# Patient Record
Sex: FEMALE | Race: WHITE | NOT HISPANIC OR LATINO | Employment: FULL TIME | URBAN - NONMETROPOLITAN AREA
[De-identification: names, ages, dates, MRNs, and addresses within clinical notes are randomized per-mention and may not be internally consistent; named-entity substitution may affect disease eponyms.]

---

## 2017-03-03 DIAGNOSIS — I10 ESSENTIAL HYPERTENSION: ICD-10-CM

## 2017-03-03 RX ORDER — LISINOPRIL AND HYDROCHLOROTHIAZIDE 20; 12.5 MG/1; MG/1
1 TABLET ORAL DAILY
Qty: 90 TAB | Refills: 3 | Status: SHIPPED | OUTPATIENT
Start: 2017-03-03 | End: 2018-04-19 | Stop reason: SDUPTHER

## 2017-03-06 ENCOUNTER — TELEPHONE (OUTPATIENT)
Dept: MEDICAL GROUP | Facility: PHYSICIAN GROUP | Age: 77
End: 2017-03-06

## 2017-03-06 NOTE — TELEPHONE ENCOUNTER
1. Caller Name: Katrin                                Call Back Number: [phone] (home)       Patient approves a detailed voicemail message: N\A    Katrin requesting refill Lisinopril/HCTZ. Informed RX approved Friday 3/3/17 [facility].

## 2017-03-31 ENCOUNTER — OFFICE VISIT (OUTPATIENT)
Dept: MEDICAL GROUP | Facility: PHYSICIAN GROUP | Age: 77
End: 2017-03-31
Payer: MEDICARE

## 2017-03-31 VITALS
RESPIRATION RATE: 16 BRPM | SYSTOLIC BLOOD PRESSURE: 140 MMHG | DIASTOLIC BLOOD PRESSURE: 96 MMHG | HEIGHT: 61 IN | BODY MASS INDEX: 31.53 KG/M2 | HEART RATE: 87 BPM | OXYGEN SATURATION: 94 % | TEMPERATURE: 97 F | WEIGHT: 167 LBS

## 2017-03-31 DIAGNOSIS — I10 ESSENTIAL HYPERTENSION: ICD-10-CM

## 2017-03-31 DIAGNOSIS — M81.0 HIGH RISK FOR FRACTURE DUE TO OSTEOPOROSIS BY DEXA SCAN: ICD-10-CM

## 2017-03-31 DIAGNOSIS — E78.5 HYPERLIPIDEMIA, UNSPECIFIED HYPERLIPIDEMIA TYPE: ICD-10-CM

## 2017-03-31 DIAGNOSIS — M67.432 GANGLION OF LEFT WRIST: ICD-10-CM

## 2017-03-31 DIAGNOSIS — M81.0 OSTEOPOROSIS: ICD-10-CM

## 2017-03-31 DIAGNOSIS — Z80.3 FAMILY HISTORY OF BREAST CANCER: ICD-10-CM

## 2017-03-31 DIAGNOSIS — Z12.31 ENCOUNTER FOR SCREENING MAMMOGRAM FOR BREAST CANCER: ICD-10-CM

## 2017-03-31 DIAGNOSIS — M67.40 GANGLION CYST: ICD-10-CM

## 2017-03-31 DIAGNOSIS — Z23 NEED FOR PNEUMOCOCCAL VACCINATION: ICD-10-CM

## 2017-03-31 DIAGNOSIS — R79.89 ELEVATED TSH: ICD-10-CM

## 2017-03-31 DIAGNOSIS — M75.52 BURSITIS OF LEFT SHOULDER: ICD-10-CM

## 2017-03-31 DIAGNOSIS — E55.9 VITAMIN D DEFICIENCY: ICD-10-CM

## 2017-03-31 PROCEDURE — 3014F SCREEN MAMMO DOC REV: CPT | Performed by: NURSE PRACTITIONER

## 2017-03-31 PROCEDURE — 3017F COLORECTAL CA SCREEN DOC REV: CPT | Performed by: NURSE PRACTITIONER

## 2017-03-31 PROCEDURE — 1101F PT FALLS ASSESS-DOCD LE1/YR: CPT | Performed by: NURSE PRACTITIONER

## 2017-03-31 PROCEDURE — 4040F PNEUMOC VAC/ADMIN/RCVD: CPT | Performed by: NURSE PRACTITIONER

## 2017-03-31 PROCEDURE — G8419 CALC BMI OUT NRM PARAM NOF/U: HCPCS | Performed by: NURSE PRACTITIONER

## 2017-03-31 PROCEDURE — G8482 FLU IMMUNIZE ORDER/ADMIN: HCPCS | Performed by: NURSE PRACTITIONER

## 2017-03-31 PROCEDURE — 1036F TOBACCO NON-USER: CPT | Performed by: NURSE PRACTITIONER

## 2017-03-31 PROCEDURE — G8432 DEP SCR NOT DOC, RNG: HCPCS | Performed by: NURSE PRACTITIONER

## 2017-03-31 PROCEDURE — 99214 OFFICE O/P EST MOD 30 MIN: CPT | Performed by: NURSE PRACTITIONER

## 2017-03-31 ASSESSMENT — PAIN SCALES - GENERAL: PAINLEVEL: NO PAIN

## 2017-03-31 ASSESSMENT — PATIENT HEALTH QUESTIONNAIRE - PHQ9: CLINICAL INTERPRETATION OF PHQ2 SCORE: 0

## 2017-03-31 NOTE — ASSESSMENT & PLAN NOTE
Unclear control, patient is due for labs prior to follow-up. Patient continues on vitamin D supplementation daily.

## 2017-03-31 NOTE — ASSESSMENT & PLAN NOTE
Unclear control, due for labs. Patient denies any hair loss, weight gain, constipation, mental fog, fatigue.

## 2017-03-31 NOTE — PROGRESS NOTES
"[facility]  Primary Care Office Visit - Problem-Oriented        History:     Katrin Jackson is a 76 y.o. female who is here today to discuss Follow-Up      Bursitis of left shoulder  Patient was seen at [facility] and diagnosed with frozen shoulder. She underwent 6 weeks of physical therapy and unfortunately this failed to improve her range of motion or pain. She underwent surgical manipulation, she now has good range of motion of her shoulder, she is diligent about doing home exercises. She has virtually no pain.     Hypertension  This is a chronic condition which is well controlled on medications. Patient is tolerating medications without side effects. She denies chest pain, shortness of breath, change of vision, headaches.  Monitoring at home SBP generally in 120-130's         Osteoporosis  This is a 76-year-old female with history of osteoporosis here for follow-up. Her last DEXA was done in 2014. She was started on Fosamax but abruptly discontinued due to severe multi-joint pain. She was approved for prolia infusion, unfortunately this caused severe multi-joint pain for over a month. She continues to take vitamin D and calcium. She continues to exercise regularly. She has had no fractures. We discussed repeating her DEXA prior to trialing any alternate medications.    Ganglion of left wrist  Ongoing, stable. Patient reports that she has a small nodule on her left ventral wrist as well as AC fossa. These nodules do not cause her pain. They have not increased in size. She declines referral for general surgery as they do not seem to bother her she \"just wanted to mention them.\"     Vitamin D deficiency  Unclear control, patient is due for labs prior to follow-up. Patient continues on vitamin D supplementation daily.    Hyperlipidemia  Unclear control, due for fasting labs.    Elevated TSH  Unclear control, due for labs. Patient denies any hair loss, weight gain, constipation, mental fog, " fatigue.    Family history of breast cancer  Due for mammogram, last done in 2016, normal.           Past Medical History   Diagnosis Date   • Arthritis    • Hypertension    • Hyperlipidemia 7/11/2014     Past Surgical History   Procedure Laterality Date   • Abdominal hysterectomy total       Social History     Social History   • Marital Status: Unknown     Spouse Name: N/A   • Number of Children: N/A   • Years of Education: N/A     Occupational History   • Not on file.     Social History Main Topics   • Smoking status: Never Smoker    • Smokeless tobacco: Never Used   • Alcohol Use: Yes      Comment: occasional   • Drug Use: No   • Sexual Activity: Not on file     Other Topics Concern   • Not on file     Social History Narrative     History   Smoking status   • Never Smoker    Smokeless tobacco   • Never Used     Family History   Problem Relation Age of Onset   • Cancer Mother 53     pancreas   • Diabetes Neg Hx    • Heart Disease Neg Hx    • Stroke Neg Hx    • Cancer Sister      breast     Allergies   Allergen Reactions   • Percodan [Oxycodone-Aspirin] Itching       Problem List:     Patient Active Problem List    Diagnosis Date Noted   • Bursitis of left shoulder 10/14/2016   • Ganglion of left wrist 10/14/2016   • Skin tag 01/15/2016   • Osteoporosis 07/11/2014   • Hyperlipidemia 07/11/2014   • Vitamin D deficiency 07/11/2014   • Hypertension 06/13/2014   • Elevated TSH 06/13/2014   • Family history of breast cancer 06/13/2014         Medications:     Current outpatient prescriptions:   •  pneumococcal 13-Elvira Conj Vacc (PREVNAR 13) syringe, 0.5 mL by Intramuscular route Once PRN for up to 1 dose. Please fax once administered to update records, Disp: 0.5 Each, Rfl: 0  •  lisinopril-hydrochlorothiazide (PRINZIDE, ZESTORETIC) 20-12.5 MG per tablet, Take 1 Tab by mouth every day., Disp: 90 Tab, Rfl: 3  •  Multiple Vitamins-Minerals (CENTRUM ADULTS PO), Take  by mouth., Disp: , Rfl:   •  Calcium Carb-Cholecalciferol  "(CALCIUM + D3 PO), Take  by mouth., Disp: , Rfl:   •  meloxicam (MOBIC) 15 MG tablet, Take 0.5-1 Tabs by mouth every day., Disp: 30 Tab, Rfl: 2      Review of Systems:     Positives as per HPI, all other systems reviewed and WNL       Physical Assessment:     VS: /96 mmHg  Pulse 87  Temp(Src) 36.1 °C (97 °F)  Resp 16  Ht 1.549 m (5' 1\")  Wt 75.751 kg (167 lb)  BMI 31.57 kg/m2  SpO2 94%    General: Well-developed, well-nourished, female     Head: PERRL, EOMI. Normocephalic. No facial asymmetry noted.  Cardiovasc:No JVD.  RRR, no MRG. No thrills or bruits. Pulses 2+ and symmetric at all distal extremities.  Pulmonary: Lungs clear bilaterally.  Normal respiratory effort. No wheeze or crackles.   Extremities: small palpable round nodules on flexor tendon sheath of left wrist and left elbow. No edema, no TTP bilateral calves. Pedal pulses intact. No joint effusions. LEs warm and well-perfused.  Neuro: Alert and oriented, CNs II-XII intact, no focal deficits.  Skin:No rashes noted. Skin warm, dry, intact    Psych: Dressed appropriately for the weather, pleasant and conversant.  Affect, mood & judgment appropriate.    Assessment/Plan:   Katrin was seen today for follow-up.    Diagnoses and all orders for this visit:    Essential hypertension, chronic, well controlled on present medication  - follow up annually, labs prior   -     COMP METABOLIC PANEL; Future    High risk for fracture due to osteoporosis by DEXA scan  - Unable to tolerate bisphosphonates, prolia. Repeat DEXA, continue vitamin D and calcium supplement as well as weightbearing exercise. Consider calcitonin.  -     DS-BONE DENSITY STUDY (DEXA); Future    Osteoporosis, unclear control   - treatment as above   -     DS-BONE DENSITY STUDY (DEXA); Future    Ganglion of left wrist, ongoing, stable     Hyperlipidemia, unspecified hyperlipidemia type, unclear control   -     LIPID PROFILE; Future    Vitamin D deficiency, unclear control   - continue " daily vitamin D supplement   -     VITAMIN D,25 HYDROXY; Future    Elevated TSH, unclear control   -     TSH WITH REFLEX TO FT4; Future    Encounter for screening mammogram for breast cancer  -     MA-SCREEN MAMMO W/CAD-BILAT; Future    Family history of breast cancer    Need for pneumococcal vaccination  -     pneumococcal 13-Elvira Conj Vacc (PREVNAR 13) syringe; 0.5 mL by Intramuscular route Once PRN for up to 1 dose. Please fax once administered to update records    Follow up annually, labs prior. Will discuss osteoporosis treatments with patient following DEXA results.     Patient is agreeable to the above plan and voiced understanding. All questions answered.     Please note that this dictation was created using voice recognition software. I have made every reasonable attempt to correct obvious errors, but I expect that there are errors of grammar and possibly content that I did not discover before finalizing the note.      KEILA Bledsoe  3/31/2017, 11:27 AM

## 2017-03-31 NOTE — ASSESSMENT & PLAN NOTE
This is a chronic condition which is well controlled on medications. Patient is tolerating medications without side effects. She denies chest pain, shortness of breath, change of vision, headaches.  Monitoring at home SBP generally in 120-130's

## 2017-03-31 NOTE — MR AVS SNAPSHOT
"        Katrin Jackson   3/31/2017 11:00 AM   Office Visit   MRN: [id number]    Department:  [facility]   Dept Phone:  [phone]    Description:  Female : 1940   Provider:  CLARITA Paulino           Reason for Visit     Follow-Up BP medication       Allergies as of 3/31/2017     Allergen Noted Reactions    Percodan [Oxycodone-Aspirin] 2012   Itching      You were diagnosed with     Bursitis of left shoulder   [491194]       Essential hypertension   [2408724]       High risk for fracture due to osteoporosis by DEXA scan   [978408]       Osteoporosis   [3617675]       Encounter for screening mammogram for breast cancer   [7997142]       Need for pneumococcal vaccination   [459713]       Ganglion cyst   [525742]         Vital Signs     Blood Pressure Pulse Temperature Respirations Height Weight    140/96 mmHg 87 36.1 °C (97 °F) 16 1.549 m (5' 1\") 75.751 kg (167 lb)    Body Mass Index Oxygen Saturation Smoking Status             31.57 kg/m2 94% Never Smoker          Basic Information     Date Of Birth Sex Race Ethnicity Preferred Language    1940 Female White Non- English      Problem List              ICD-10-CM Priority Class Noted - Resolved    Chest pain R07.9   2014 - Present    Hypertension I10   2014 - Present    Elevated TSH R94.6   2014 - Present    Family history of breast cancer Z80.3   2014 - Present    Osteoporosis M81.0   2014 - Present    Hyperlipidemia E78.5   2014 - Present    Vitamin D deficiency E55.9   2014 - Present    Skin tag L91.8   1/15/2016 - Present    Bursitis of left shoulder M75.52   10/14/2016 - Present    Ganglion of left wrist M67.432   10/14/2016 - Present      Health Maintenance        Date Due Completion Dates    IMM DTaP/Tdap/Td Vaccine (1 - Tdap) 1961 ---    IMM ZOSTER VACCINE 2002 ---    IMM PNEUMOCOCCAL 65+ (ADULT) LOW/MEDIUM RISK SERIES (1 of 2 - PCV13) 2007 ---    MAMMOGRAM 2017 " 1/26/2016, 6/20/2014    BONE DENSITY 6/13/2019 6/13/2014 (Done)    Override on 6/13/2014: Done    COLONOSCOPY 6/13/2019 6/13/2009 (Prv Comp)    Override on 6/13/2009: Previously completed            Current Immunizations     Influenza Vaccine Adult HD 10/14/2016    Pneumococcal polysaccharide vaccine (PPSV-23) 10/18/2013      Below and/or attached are the medications your provider expects you to take. Review all of your home medications and newly ordered medications with your provider and/or pharmacist. Follow medication instructions as directed by your provider and/or pharmacist. Please keep your medication list with you and share with your provider. Update the information when medications are discontinued, doses are changed, or new medications (including over-the-counter products) are added; and carry medication information at all times in the event of emergency situations     Allergies:  PERCODAN - Itching               Medications  Valid as of: March 31, 2017 - 11:21 AM    Generic Name Brand Name Tablet Size Instructions for use    Calcium Carb-Cholecalciferol   Take  by mouth.        Lisinopril-Hydrochlorothiazide (Tab) PRINZIDE, ZESTORETIC 20-12.5 MG Take 1 Tab by mouth every day.        Meloxicam (Tab) MOBIC 15 MG Take 0.5-1 Tabs by mouth every day.        Multiple Vitamins-Minerals   Take  by mouth.        Pneumococcal 13-Elvira Conj Vacc (Suspension) PREVNAR 13  0.5 mL by Intramuscular route Once PRN for up to 1 dose. Please fax once administered to update records        .                 Medicines prescribed today were sent to:     [facility] [address] [place] [address] [address] [facility]    [address] [facility] [address]    Phone: [phone] Fax: [phone]    Open 24 Hours?: No      Medication refill instructions:       If your prescription bottle indicates you have medication refills left, it is not necessary to call your provider’s office. Please contact your pharmacy and they will  refill your medication.    If your prescription bottle indicates you do not have any refills left, you may request refills at any time through one of the following ways: The online [employer] system (except Urgent Care), by calling your provider’s office, or by asking your pharmacy to contact your provider’s office with a refill request. Medication refills are processed only during regular business hours and may not be available until the next business day. Your provider may request additional information or to have a follow-up visit with you prior to refilling your medication.   *Please Note: Medication refills are assigned a new Rx number when refilled electronically. Your pharmacy may indicate that no refills were authorized even though a new prescription for the same medication is available at the pharmacy. Please request the medicine by name with the pharmacy before contacting your provider for a refill.        Your To Do List     Future Labs/Procedures Complete By Expires    DS-BONE DENSITY STUDY (DEXA)  As directed 10/1/2017    MA-SCREEN MAMMO W/CAD-BILAT  As directed 3/31/2018         [employer] Access Code: [email or web address]  Expires: 4/30/2017 11:21 AM    [employer]  A secure, online tool to manage your health information     [employer]® is a secure, online tool that connects you to your personalized health information from the privacy of your home -- day or night - making it very easy for you to manage your healthcare. Once the activation process is completed, you can even access your medical information using the [employer] yany, which is available for free in the Apple Yany store or Google Play store.     [employer] provides the following levels of access (as shown below):   My Chart Features   [facility] Doctor [facility]  Specialists [facility]  Urgent  Care Non-[facility]  Primary Care  Doctor   Email your healthcare team securely and privately 24/7 X X X    Manage appointments: schedule your next  appointment; view details of past/upcoming appointments X      Request prescription refills. X      View recent personal medical records, including lab and immunizations X X X X   View health record, including health history, allergies, medications X X X X   Read reports about your outpatient visits, procedures, consult and ER notes X X X X   See your discharge summary, which is a recap of your hospital and/or ER visit that includes your diagnosis, lab results, and care plan. X X       How to register for [employer]:  1. Go to  [email or web address].  2. Click on the Sign Up Now box, which takes you to the New Member Sign Up page. You will need to provide the following information:  a. Enter your [employer] Access Code exactly as it appears at the top of this page. (You will not need to use this code after you’ve completed the sign-up process. If you do not sign up before the expiration date, you must request a new code.)   b. Enter your date of birth.   c. Enter your home email address.   d. Click Submit, and follow the next screen’s instructions.  3. Create a [employer] ID. This will be your [employer] login ID and cannot be changed, so think of one that is secure and easy to remember.  4. Create a [employer] password. You can change your password at any time.  5. Enter your Password Reset Question and Answer. This can be used at a later time if you forget your password.   6. Enter your e-mail address. This allows you to receive e-mail notifications when new information is available in [employer].  7. Click Sign Up. You can now view your health information.    For assistance activating your [employer] account, call [phone]

## 2017-03-31 NOTE — ASSESSMENT & PLAN NOTE
"Ongoing, stable. Patient reports that she has a small nodule on her left ventral wrist as well as AC fossa. These nodules do not cause her pain. They have not increased in size. She declines referral for general surgery as they do not seem to bother her she \"just wanted to mention them.\"   "

## 2017-03-31 NOTE — ASSESSMENT & PLAN NOTE
This is a 76-year-old female with history of osteoporosis here for follow-up. Her last DEXA was done in 2014. She was started on Fosamax but abruptly discontinued due to severe multi-joint pain. She was approved for prolia infusion, unfortunately this caused severe multi-joint pain for over a month. She continues to take vitamin D and calcium. She continues to exercise regularly. She has had no fractures. We discussed repeating her DEXA prior to trialing any alternate medications.

## 2017-03-31 NOTE — ASSESSMENT & PLAN NOTE
Patient was seen at [facility] and diagnosed with frozen shoulder. She underwent 6 weeks of physical therapy and unfortunately this failed to improve her range of motion or pain. She underwent surgical manipulation, she now has good range of motion of her shoulder, she is diligent about doing home exercises. She has virtually no pain.

## 2017-09-14 ENCOUNTER — TELEPHONE (OUTPATIENT)
Dept: MEDICAL GROUP | Facility: PHYSICIAN GROUP | Age: 77
End: 2017-09-14

## 2017-09-14 DIAGNOSIS — M81.0 AGE-RELATED OSTEOPOROSIS WITHOUT CURRENT PATHOLOGICAL FRACTURE: ICD-10-CM

## 2017-09-14 NOTE — TELEPHONE ENCOUNTER
Patient's bone density test is reviewed. It appears that she has osteoporosis. She needs to follow-up with her provider to discuss possible treatment and/or follow-up. Please notify.    Leah Berrios    .

## 2017-10-02 ENCOUNTER — TELEPHONE (OUTPATIENT)
Dept: MEDICAL GROUP | Facility: PHYSICIAN GROUP | Age: 77
End: 2017-10-02

## 2017-10-02 NOTE — TELEPHONE ENCOUNTER
The alternative to Evista is making sure she is on ca and vit d , regular weight bearing exercises and rechecking dexa in a year. Either option would be fine.   Adilia Russell M.D.

## 2017-10-02 NOTE — TELEPHONE ENCOUNTER
Jerica,   I looked it up, seems like a good option as long as she has no active breast cancer or DVT or clots. And advise on risk of cardiovascular disease and blood clots.   Adilia Russell M.D.

## 2017-10-02 NOTE — TELEPHONE ENCOUNTER
----- Message from CLARITA Paulino sent at 10/2/2017  9:17 AM PDT -----  Regarding: osteoporosis   Hi Dr. LO,     I have a patient with DEXA confirmed osteopenia, T score of -2.4 which as you know is right on the border of osteopenia and osteoporosis.     In the past she has been treated with Fosamax and Prolia, both of which caused extreme multi-joint pain. She is interested in treatment.     I don't have any experience with Evista which is an antiestrogen and I don't know that I feel comfortable prescribing this without discussing with you first.     Do you have any other recommendations?

## 2017-10-16 ENCOUNTER — OFFICE VISIT (OUTPATIENT)
Dept: MEDICAL GROUP | Facility: PHYSICIAN GROUP | Age: 77
End: 2017-10-16
Payer: MEDICARE

## 2017-10-16 VITALS
RESPIRATION RATE: 16 BRPM | DIASTOLIC BLOOD PRESSURE: 78 MMHG | OXYGEN SATURATION: 97 % | WEIGHT: 165 LBS | SYSTOLIC BLOOD PRESSURE: 136 MMHG | BODY MASS INDEX: 31.15 KG/M2 | HEART RATE: 78 BPM | TEMPERATURE: 97.8 F | HEIGHT: 61 IN

## 2017-10-16 DIAGNOSIS — E55.9 VITAMIN D DEFICIENCY: ICD-10-CM

## 2017-10-16 DIAGNOSIS — E78.5 DYSLIPIDEMIA: ICD-10-CM

## 2017-10-16 DIAGNOSIS — M81.0 AGE-RELATED OSTEOPOROSIS WITHOUT CURRENT PATHOLOGICAL FRACTURE: ICD-10-CM

## 2017-10-16 DIAGNOSIS — I10 ESSENTIAL HYPERTENSION: ICD-10-CM

## 2017-10-16 PROCEDURE — 99214 OFFICE O/P EST MOD 30 MIN: CPT | Performed by: FAMILY MEDICINE

## 2017-10-16 NOTE — PROGRESS NOTES
"Subjective:   Katrin Jackson is a 77 y.o. female here today for evaluation and management of:     Osteoporosis  Improved now osteopenia  Side effects to bisphosphonates  Both oral and infusion  Continue on ca 1200mg and on vit D at least 1000 daily.       Hypertension  Chronic well controlled.      mammo just done at [facility] normal reported by patient.     Current medicines (including changes today)  Current Outpatient Prescriptions   Medication Sig Dispense Refill   • lisinopril-hydrochlorothiazide (PRINZIDE, ZESTORETIC) 20-12.5 MG per tablet Take 1 Tab by mouth every day. 90 Tab 3   • Multiple Vitamins-Minerals (CENTRUM ADULTS PO) Take  by mouth.     • Calcium Carb-Cholecalciferol (CALCIUM + D3 PO) Take  by mouth.       No current facility-administered medications for this visit.      She  has a past medical history of Arthritis; Hyperlipidemia (7/11/2014); and Hypertension. She also has no past medical history of Allergy, unspecified not elsewhere classified; Clotting disorder (CMS-[facility]); Heart attack; Heart murmur; Seizure (CMS-HCC); Stroke (CMS-HCC); Thyroid disease; Type II or unspecified type diabetes mellitus without mention of complication, not stated as uncontrolled; or Unspecified asthma(493.90).    ROS  No chest pain, no shortness of breath, no abdominal pain       Objective:     Blood pressure 136/78, pulse 78, temperature 36.6 °C (97.8 °F), resp. rate 16, height 1.549 m (5' 1\"), weight 74.8 kg (165 lb), SpO2 97 %. Body mass index is 31.18 kg/m².   Physical Exam:  Constitutional: Alert, no distress.  Skin: Warm, dry, good turgor, no rashes in visible areas.  Eye: Equal, round and reactive, conjunctiva clear, lids normal.  ENMT: Lips without lesions, good dentition, oropharynx clear.  Neck: Trachea midline, no masses, no thyromegaly. No cervical or supraclavicular lymphadenopathy  Respiratory: Unlabored respiratory effort, lungs clear to auscultation, no wheezes, no ronchi.  Cardiovascular: Normal S1, S2, " no murmur, no edema.  Abdomen: Soft, non-tender, no masses, no hepatosplenomegaly.  Psych: Alert and oriented x3, normal affect and mood.        Assessment and Plan:   The following treatment plan was discussed    1. Age-related osteoporosis without current pathological fracture  Recent dexa reviewed with patient. Advised to continue with calcium and vitamin d supplements and with regular weight bearing exercises.       Followup: Return in about 6 months (around 4/16/2018) for labs. .

## 2017-10-16 NOTE — ASSESSMENT & PLAN NOTE
Improved now osteopenia  Side effects to bisphosphonates  Both oral and infusion  Continue on ca 1200mg and on vit D at least 1000 daily.

## 2017-10-18 ENCOUNTER — HOSPITAL ENCOUNTER (OUTPATIENT)
Dept: LAB | Facility: MEDICAL CENTER | Age: 77
End: 2017-10-18
Attending: FAMILY MEDICINE
Payer: MEDICARE

## 2017-10-18 DIAGNOSIS — M81.0 AGE-RELATED OSTEOPOROSIS WITHOUT CURRENT PATHOLOGICAL FRACTURE: ICD-10-CM

## 2017-10-18 DIAGNOSIS — E78.5 DYSLIPIDEMIA: ICD-10-CM

## 2017-10-18 DIAGNOSIS — E55.9 VITAMIN D DEFICIENCY: ICD-10-CM

## 2017-10-18 LAB
25(OH)D3 SERPL-MCNC: 33 NG/ML (ref 30–100)
ALBUMIN SERPL BCP-MCNC: 4.3 G/DL (ref 3.2–4.9)
ALBUMIN/GLOB SERPL: 1.7 G/DL
ALP SERPL-CCNC: 61 U/L (ref 30–99)
ALT SERPL-CCNC: 25 U/L (ref 2–50)
ANION GAP SERPL CALC-SCNC: 8 MMOL/L (ref 0–11.9)
AST SERPL-CCNC: 24 U/L (ref 12–45)
BILIRUB SERPL-MCNC: 0.5 MG/DL (ref 0.1–1.5)
BUN SERPL-MCNC: 22 MG/DL (ref 8–22)
CALCIUM SERPL-MCNC: 10.2 MG/DL (ref 8.5–10.5)
CHLORIDE SERPL-SCNC: 106 MMOL/L (ref 96–112)
CHOLEST SERPL-MCNC: 191 MG/DL (ref 100–199)
CO2 SERPL-SCNC: 28 MMOL/L (ref 20–33)
CREAT SERPL-MCNC: 0.82 MG/DL (ref 0.5–1.4)
GFR SERPL CREATININE-BSD FRML MDRD: >60 ML/MIN/1.73 M 2
GLOBULIN SER CALC-MCNC: 2.6 G/DL (ref 1.9–3.5)
GLUCOSE SERPL-MCNC: 79 MG/DL (ref 65–99)
HDLC SERPL-MCNC: 41 MG/DL
LDLC SERPL CALC-MCNC: 113 MG/DL
POTASSIUM SERPL-SCNC: 3.9 MMOL/L (ref 3.6–5.5)
PROT SERPL-MCNC: 6.9 G/DL (ref 6–8.2)
SODIUM SERPL-SCNC: 142 MMOL/L (ref 135–145)
TRIGL SERPL-MCNC: 184 MG/DL (ref 0–149)

## 2017-10-18 PROCEDURE — 80061 LIPID PANEL: CPT

## 2017-10-18 PROCEDURE — 36415 COLL VENOUS BLD VENIPUNCTURE: CPT

## 2017-10-18 PROCEDURE — 80053 COMPREHEN METABOLIC PANEL: CPT

## 2017-10-18 PROCEDURE — 82306 VITAMIN D 25 HYDROXY: CPT

## 2018-01-02 ENCOUNTER — TELEPHONE (OUTPATIENT)
Dept: MEDICAL GROUP | Facility: PHYSICIAN GROUP | Age: 78
End: 2018-01-02

## 2018-04-16 ENCOUNTER — OFFICE VISIT (OUTPATIENT)
Dept: MEDICAL GROUP | Facility: PHYSICIAN GROUP | Age: 78
End: 2018-04-16
Payer: MEDICARE

## 2018-04-16 VITALS
RESPIRATION RATE: 16 BRPM | WEIGHT: 177 LBS | BODY MASS INDEX: 33.42 KG/M2 | HEART RATE: 78 BPM | HEIGHT: 61 IN | SYSTOLIC BLOOD PRESSURE: 132 MMHG | OXYGEN SATURATION: 95 % | DIASTOLIC BLOOD PRESSURE: 76 MMHG | TEMPERATURE: 98.7 F

## 2018-04-16 DIAGNOSIS — E66.9 OBESITY (BMI 30-39.9): ICD-10-CM

## 2018-04-16 DIAGNOSIS — E78.5 HYPERLIPIDEMIA, UNSPECIFIED HYPERLIPIDEMIA TYPE: ICD-10-CM

## 2018-04-16 DIAGNOSIS — M81.0 AGE-RELATED OSTEOPOROSIS WITHOUT CURRENT PATHOLOGICAL FRACTURE: ICD-10-CM

## 2018-04-16 DIAGNOSIS — R79.89 ELEVATED TSH: ICD-10-CM

## 2018-04-16 DIAGNOSIS — I10 ESSENTIAL HYPERTENSION: ICD-10-CM

## 2018-04-16 DIAGNOSIS — Z12.39 BREAST CANCER SCREENING: ICD-10-CM

## 2018-04-16 PROCEDURE — 99214 OFFICE O/P EST MOD 30 MIN: CPT | Performed by: FAMILY MEDICINE

## 2018-04-16 ASSESSMENT — PATIENT HEALTH QUESTIONNAIRE - PHQ9: CLINICAL INTERPRETATION OF PHQ2 SCORE: 0

## 2018-04-16 NOTE — ASSESSMENT & PLAN NOTE
She takes lisinopril-hctz 20-12.5 with normal blood pressure control, CMP reviewed and normal  She has no chest pain, no swelling in legs, no palpitations.

## 2018-04-16 NOTE — ASSESSMENT & PLAN NOTE
Most recent dexa in 2017 showed improvement in bone density. She takes ca and vit D  Encouraged regular weight bearing exercises.   Recheck dexa in 2019

## 2018-04-16 NOTE — ASSESSMENT & PLAN NOTE
Elevated TG   LDL improving   HDL above 40  Encouraged vegetables in diet  Results for SACHIN HOSKINS (MRN [id number]) as of 4/16/2018 08:15   Ref. Range 10/18/2017 08:30   Cholesterol,Tot Latest Ref Range: 100 - 199 mg/dL 191   Triglycerides Latest Ref Range: 0 - 149 mg/dL 184 (H)   HDL Latest Ref Range: >=40 mg/dL 41   LDL Latest Ref Range: <100 mg/dL 113 (H)

## 2018-04-16 NOTE — PROGRESS NOTES
"Subjective:   Sachin Jackson is a 77 y.o. female here today for evaluation and management of:     Hyperlipidemia  Elevated TG   LDL improving   HDL above 40  Encouraged vegetables in diet  Results for SACHIN JACKSON (MRN [id number]) as of 4/16/2018 08:15   Ref. Range 10/18/2017 08:30   Cholesterol,Tot Latest Ref Range: 100 - 199 mg/dL 191   Triglycerides Latest Ref Range: 0 - 149 mg/dL 184 (H)   HDL Latest Ref Range: >=40 mg/dL 41   LDL Latest Ref Range: <100 mg/dL 113 (H)       Hypertension  She takes lisinopril-hctz 20-12.5 with normal blood pressure control, CMP reviewed and normal  She has no chest pain, no swelling in legs, no palpitations.     Osteoporosis  Most recent dexa in 2017 showed improvement in bone density. She takes ca and vit D  Encouraged regular weight bearing exercises.   Recheck dexa in 2019         Current medicines (including changes today)  Current Outpatient Prescriptions   Medication Sig Dispense Refill   • lisinopril-hydrochlorothiazide (PRINZIDE, ZESTORETIC) 20-12.5 MG per tablet Take 1 Tab by mouth every day. 90 Tab 3   • Multiple Vitamins-Minerals (CENTRUM ADULTS PO) Take  by mouth.     • Calcium Carb-Cholecalciferol (CALCIUM + D3 PO) Take  by mouth.       No current facility-administered medications for this visit.      She  has a past medical history of Arthritis; Hyperlipidemia (7/11/2014); and Hypertension. She also has no past medical history of Allergy, unspecified not elsewhere classified; Clotting disorder (CMS-[facility]); Heart attack; Heart murmur; Seizure (CMS-HCC); Stroke (CMS-HCC); Thyroid disease; Type II or unspecified type diabetes mellitus without mention of complication, not stated as uncontrolled; or Unspecified asthma(493.90).    ROS  No chest pain, no shortness of breath, no abdominal pain       Objective:     Blood pressure 132/76, pulse 78, temperature 37.1 °C (98.7 °F), resp. rate 16, height 1.549 m (5' 1\"), weight 80.3 kg (177 lb), SpO2 95 %. Body mass index is " 33.44 kg/m².   Physical Exam:  Constitutional: Alert, no distress.  Skin: Warm, dry, good turgor, no rashes in visible areas.  Eye: Equal, round and reactive, conjunctiva clear, lids normal.  ENMT: Lips without lesions, good dentition, oropharynx clear.  Neck: Trachea midline, no masses, no thyromegaly. No cervical or supraclavicular lymphadenopathy  Respiratory: Unlabored respiratory effort, lungs clear to auscultation, no wheezes, no ronchi.  Cardiovascular: Normal S1, S2, no murmur, no edema.  Abdomen: Soft, non-tender, no masses, no hepatosplenomegaly.  Psych: Alert and oriented x3, normal affect and mood.        Assessment and Plan:   The following treatment plan was discussed    1. Obesity (BMI 30-39.9)  - Patient identified as having weight management issue.  Appropriate orders and counseling given.    2. Breast cancer screening  - MA-SCREEN MAMMO W/CAD-BILAT; Future    3. Hyperlipidemia, unspecified hyperlipidemia type  Improving, advised on diet changes, regular activity.     4. Essential hypertension  Well controlled, continue lisinopril-hctz    5. Age-related osteoporosis without current pathological fracture  Improving  Continue vit D and calcium  Continue weight bearing exercises  Recheck dexa 2019      Followup: No Follow-up on file.

## 2018-04-19 DIAGNOSIS — I10 ESSENTIAL HYPERTENSION: ICD-10-CM

## 2018-04-19 NOTE — TELEPHONE ENCOUNTER
Patient is leaving out of town tomorrow, and is needing lisinpril to be sent [facility] pharmacy. Please advise thank you.

## 2018-04-20 DIAGNOSIS — I10 ESSENTIAL HYPERTENSION: ICD-10-CM

## 2018-04-20 NOTE — TELEPHONE ENCOUNTER
Was the patient seen in the last year in this department? Yes     Does patient have an active prescription for medications requested? No     Received Request Via: Pharmacy      Pt met protocol?: Yes    LAST OV 04/16/2018    BP Readings from Last 1 Encounters:   04/16/18 132/76     Lab Results   Component Value Date/Time    CHOLSTRLTOT 191 10/18/2017 08:30 AM     (H) 10/18/2017 08:30 AM    HDL 41 10/18/2017 08:30 AM    TRIGLYCERIDE 184 (H) 10/18/2017 08:30 AM       Lab Results   Component Value Date/Time    SODIUM 142 10/18/2017 08:30 AM    POTASSIUM 3.9 10/18/2017 08:30 AM    CHLORIDE 106 10/18/2017 08:30 AM    CO2 28 10/18/2017 08:30 AM    GLUCOSE 79 10/18/2017 08:30 AM    BUN 22 10/18/2017 08:30 AM    CREATININE 0.82 10/18/2017 08:30 AM     Lab Results   Component Value Date/Time    ALKPHOSPHAT 61 10/18/2017 08:30 AM    ASTSGOT 24 10/18/2017 08:30 AM    ALTSGPT 25 10/18/2017 08:30 AM    TBILIRUBIN 0.5 10/18/2017 08:30 AM

## 2018-04-21 RX ORDER — LISINOPRIL AND HYDROCHLOROTHIAZIDE 20; 12.5 MG/1; MG/1
1 TABLET ORAL DAILY
Qty: 90 TAB | Refills: 3 | Status: SHIPPED | OUTPATIENT
Start: 2018-04-21 | End: 2019-05-16 | Stop reason: SDUPTHER

## 2018-04-21 RX ORDER — LISINOPRIL AND HYDROCHLOROTHIAZIDE 20; 12.5 MG/1; MG/1
TABLET ORAL
Refills: 3 | OUTPATIENT
Start: 2018-04-21

## 2018-04-23 ENCOUNTER — TELEPHONE (OUTPATIENT)
Dept: MEDICAL GROUP | Facility: PHYSICIAN GROUP | Age: 78
End: 2018-04-23

## 2019-05-16 DIAGNOSIS — I10 ESSENTIAL HYPERTENSION: ICD-10-CM

## 2019-05-16 RX ORDER — LISINOPRIL AND HYDROCHLOROTHIAZIDE 20; 12.5 MG/1; MG/1
1 TABLET ORAL DAILY
Qty: 90 TAB | Refills: 3 | Status: SHIPPED | OUTPATIENT
Start: 2019-05-16 | End: 2020-05-14

## 2019-05-16 NOTE — TELEPHONE ENCOUNTER
Was the patient seen in the last year in this department? Yes    Does patient have an active prescription for medications requested? Yes    Received Request Via: Patient     Patient is out of med

## 2019-12-24 ENCOUNTER — OFFICE VISIT (OUTPATIENT)
Dept: URGENT CARE | Facility: PHYSICIAN GROUP | Age: 79
End: 2019-12-24
Payer: MEDICARE

## 2019-12-24 VITALS
TEMPERATURE: 98 F | WEIGHT: 173 LBS | RESPIRATION RATE: 16 BRPM | DIASTOLIC BLOOD PRESSURE: 88 MMHG | HEIGHT: 62 IN | SYSTOLIC BLOOD PRESSURE: 128 MMHG | HEART RATE: 84 BPM | BODY MASS INDEX: 31.83 KG/M2 | OXYGEN SATURATION: 92 %

## 2019-12-24 DIAGNOSIS — H92.01 OTALGIA OF RIGHT EAR: ICD-10-CM

## 2019-12-24 DIAGNOSIS — H65.191 ACUTE EFFUSION OF RIGHT EAR: ICD-10-CM

## 2019-12-24 PROCEDURE — 99214 OFFICE O/P EST MOD 30 MIN: CPT | Performed by: PHYSICIAN ASSISTANT

## 2019-12-24 RX ORDER — AMOXICILLIN AND CLAVULANATE POTASSIUM 875; 125 MG/1; MG/1
1 TABLET, FILM COATED ORAL 2 TIMES DAILY
Qty: 14 TAB | Refills: 0 | Status: SHIPPED | OUTPATIENT
Start: 2019-12-24 | End: 2019-12-31

## 2019-12-24 RX ORDER — FLUTICASONE PROPIONATE 50 MCG
1 SPRAY, SUSPENSION (ML) NASAL DAILY
Qty: 16 G | Refills: 0 | Status: SHIPPED | OUTPATIENT
Start: 2019-12-24 | End: 2020-09-22

## 2019-12-24 ASSESSMENT — ENCOUNTER SYMPTOMS
NAUSEA: 0
FEVER: 0
CHILLS: 0
VOMITING: 0
SINUS PAIN: 0

## 2019-12-24 NOTE — PROGRESS NOTES
Subjective:   Katrin Jackson is a 79 y.o. female who presents for Otalgia (goes down to her jaw x 4 days)        This is a new problem.  Patient complains of right ear pain x4 days.  Pain is deep and aching and occasionally radiates down her jaw.  Intermittent tooth pain. No pain with chewing, sinus pressure, nasal congestion.  Pain is worse at night.  No other aggravating or alleviating factors.  She is taking Tylenol and ibuprofen with moderate symptomatic improvement.  Denies nausea, vomiting, fevers, chills.  Denies history of recurrent ear infections.  No allergies to medications.    Review of Systems   Constitutional: Negative for chills and fever.   HENT: Positive for ear pain. Negative for congestion, hearing loss, sinus pain and tinnitus.    Gastrointestinal: Negative for nausea and vomiting.       PMH:  has a past medical history of Arthritis, Hyperlipidemia (7/11/2014), and Hypertension. She also has no past medical history of Allergy, unspecified not elsewhere classified, Clotting disorder (HCC), Heart attack (HCC), Heart murmur, Seizure (HCC), Stroke (HCC), Thyroid disease, Type II or unspecified type diabetes mellitus without mention of complication, not stated as uncontrolled, or Unspecified asthma(493.90).  MEDS:   Current Outpatient Medications:   •  amoxicillin-clavulanate (AUGMENTIN) 875-125 MG Tab, Take 1 Tab by mouth 2 times a day for 7 days., Disp: 14 Tab, Rfl: 0  •  fluticasone (FLONASE) 50 MCG/ACT nasal spray, Spray 1 Spray in nose every day., Disp: 16 g, Rfl: 0  •  lisinopril-hydrochlorothiazide (PRINZIDE, ZESTORETIC) 20-12.5 MG per tablet, Take 1 Tab by mouth every day., Disp: 90 Tab, Rfl: 3  •  Multiple Vitamins-Minerals (CENTRUM ADULTS PO), Take  by mouth., Disp: , Rfl:   •  Calcium Carb-Cholecalciferol (CALCIUM + D3 PO), Take  by mouth., Disp: , Rfl:   ALLERGIES:   Allergies   Allergen Reactions   • Percodan [Oxycodone-Aspirin] Itching     SURGHX:   Past Surgical History:   Procedure  "Laterality Date   • SHOULDER SURGERY Left 2016    adhesive capsulitis   • ABDOMINAL HYSTERECTOMY TOTAL       SOCHX:  reports that she has never smoked. She has never used smokeless tobacco. She reports current alcohol use. She reports that she does not use drugs.  FH: Family history was reviewed, no pertinent findings to report   Objective:   /88   Pulse 84   Temp 36.7 °C (98 °F) (Temporal)   Resp 16   Ht 1.575 m (5' 2\")   Wt 78.5 kg (173 lb)   SpO2 92%   BMI 31.64 kg/m²   Physical Exam  HENT:      Head:      Comments: Jaw occlusion normal.  TMJ nontender to palpation-no crepitus or clicking.     Right Ear: Ear canal and external ear normal. No mastoid tenderness.      Left Ear: Ear canal and external ear normal. No mastoid tenderness. Tympanic membrane is injected, erythematous and bulging.      Ears:      Comments: Right TM is intact, bulging, opaque, with visible bubbles behind the TM.  Loss of light reflex.  No significant erythema or injection.  No visible pus.     Nose: Nose normal. No mucosal edema or rhinorrhea.      Right Sinus: No maxillary sinus tenderness or frontal sinus tenderness.      Left Sinus: No maxillary sinus tenderness or frontal sinus tenderness.      Mouth/Throat:      Lips: Pink.      Mouth: Mucous membranes are moist.      Pharynx: Oropharynx is clear. Uvula midline.      Comments: Teeth nontender to percussion.  Lymphadenopathy:      Head:      Right side of head: No preauricular or posterior auricular adenopathy.      Left side of head: No preauricular or posterior auricular adenopathy.      Cervical:      Right cervical: No superficial or posterior cervical adenopathy.     Left cervical: No superficial or posterior cervical adenopathy.           Assessment/Plan:   1. Acute effusion of right ear  - amoxicillin-clavulanate (AUGMENTIN) 875-125 MG Tab; Take 1 Tab by mouth 2 times a day for 7 days.  Dispense: 14 Tab; Refill: 0  - fluticasone (FLONASE) 50 MCG/ACT nasal spray; " Spray 1 Spray in nose every day.  Dispense: 16 g; Refill: 0    2. Otalgia of right ear  - amoxicillin-clavulanate (AUGMENTIN) 875-125 MG Tab; Take 1 Tab by mouth 2 times a day for 7 days.  Dispense: 14 Tab; Refill: 0  - fluticasone (FLONASE) 50 MCG/ACT nasal spray; Spray 1 Spray in nose every day.  Dispense: 16 g; Refill: 0    Patient has right-sided ear effusion that appears to be progressing towards an infection.  Patient started on antibiotic therapy and an intranasal steroid.  If patient's symptoms worsen, new symptoms develop or symptoms fail to improve in the next 2 to 3 days I would like her to return to clinic for reevaluation.    Differential diagnosis, natural history, supportive care, and indications for immediate follow-up discussed.

## 2020-05-13 DIAGNOSIS — I10 ESSENTIAL HYPERTENSION: ICD-10-CM

## 2020-05-14 RX ORDER — LISINOPRIL AND HYDROCHLOROTHIAZIDE 20; 12.5 MG/1; MG/1
TABLET ORAL
Qty: 90 TAB | Refills: 0 | Status: SHIPPED | OUTPATIENT
Start: 2020-05-14 | End: 2020-09-21

## 2020-05-14 NOTE — TELEPHONE ENCOUNTER
Was the patient seen in the last year in this department? No     Does patient have an active prescription for medications requested? No     Received Request Via: Pharmacy    Pt met protocol?: No     Last OV 04/16/2018    BP Readings from Last 1 Encounters:   12/24/19 128/88

## 2020-05-14 NOTE — TELEPHONE ENCOUNTER
Last Blood Pressure reading was 128/88 on 12/24/19. Last seen by PCP 04/16/2018.  Pt to make appt prior to more refills.     Will send 3 month(s) to the pharmacy.

## 2020-09-16 DIAGNOSIS — I10 ESSENTIAL HYPERTENSION: ICD-10-CM

## 2020-09-16 RX ORDER — LISINOPRIL AND HYDROCHLOROTHIAZIDE 20; 12.5 MG/1; MG/1
TABLET ORAL
Qty: 90 TAB | Refills: 0 | OUTPATIENT
Start: 2020-09-16

## 2020-09-18 DIAGNOSIS — I10 ESSENTIAL HYPERTENSION: ICD-10-CM

## 2020-09-21 RX ORDER — LISINOPRIL AND HYDROCHLOROTHIAZIDE 20; 12.5 MG/1; MG/1
1 TABLET ORAL DAILY
Qty: 30 TAB | Refills: 0 | Status: SHIPPED | OUTPATIENT
Start: 2020-09-21 | End: 2020-09-22 | Stop reason: SDUPTHER

## 2020-09-22 ENCOUNTER — OFFICE VISIT (OUTPATIENT)
Dept: MEDICAL GROUP | Facility: PHYSICIAN GROUP | Age: 80
End: 2020-09-22
Payer: MEDICARE

## 2020-09-22 VITALS
TEMPERATURE: 98.3 F | HEIGHT: 62 IN | HEART RATE: 95 BPM | SYSTOLIC BLOOD PRESSURE: 142 MMHG | WEIGHT: 171 LBS | BODY MASS INDEX: 31.47 KG/M2 | DIASTOLIC BLOOD PRESSURE: 86 MMHG | RESPIRATION RATE: 12 BRPM | OXYGEN SATURATION: 92 %

## 2020-09-22 DIAGNOSIS — E78.5 HYPERLIPIDEMIA, UNSPECIFIED HYPERLIPIDEMIA TYPE: ICD-10-CM

## 2020-09-22 DIAGNOSIS — E66.9 OBESITY (BMI 30-39.9): ICD-10-CM

## 2020-09-22 DIAGNOSIS — I10 ESSENTIAL HYPERTENSION: ICD-10-CM

## 2020-09-22 DIAGNOSIS — Z12.11 COLON CANCER SCREENING: ICD-10-CM

## 2020-09-22 DIAGNOSIS — Z12.31 ENCOUNTER FOR SCREENING MAMMOGRAM FOR BREAST CANCER: ICD-10-CM

## 2020-09-22 DIAGNOSIS — Z23 NEED FOR VACCINATION: ICD-10-CM

## 2020-09-22 PROCEDURE — G0008 ADMIN INFLUENZA VIRUS VAC: HCPCS | Performed by: FAMILY MEDICINE

## 2020-09-22 PROCEDURE — 99214 OFFICE O/P EST MOD 30 MIN: CPT | Mod: 25 | Performed by: FAMILY MEDICINE

## 2020-09-22 PROCEDURE — 90662 IIV NO PRSV INCREASED AG IM: CPT | Performed by: FAMILY MEDICINE

## 2020-09-22 RX ORDER — LISINOPRIL AND HYDROCHLOROTHIAZIDE 20; 12.5 MG/1; MG/1
1 TABLET ORAL DAILY
Qty: 90 TAB | Refills: 3 | Status: SHIPPED | OUTPATIENT
Start: 2020-09-22 | End: 2021-10-08 | Stop reason: SDUPTHER

## 2020-09-22 ASSESSMENT — PATIENT HEALTH QUESTIONNAIRE - PHQ9: CLINICAL INTERPRETATION OF PHQ2 SCORE: 0

## 2020-09-22 NOTE — ASSESSMENT & PLAN NOTE
Has mild swelling on top of both feet.   Today BP elevated 142/94   She has no CP, no SOB  Will recheck BP at end of visit.   Continue on lisinopril hctz 20-12.5  Encouraged low salt diet, gradual weight loss.

## 2020-09-23 ENCOUNTER — TELEPHONE (OUTPATIENT)
Dept: MEDICAL GROUP | Facility: PHYSICIAN GROUP | Age: 80
End: 2020-09-23

## 2020-09-23 NOTE — PROGRESS NOTES
"Subjective:   Katrin Jackson is a 80 y.o. female here today for evaluation and management of:     Hypertension  Has mild swelling on top of both feet.   Today BP elevated 142/94   She has no CP, no SOB  Will recheck BP at end of visit.   Continue on lisinopril hctz 20-12.5  Encouraged low salt diet, gradual weight loss.          Current medicines (including changes today)  Current Outpatient Medications   Medication Sig Dispense Refill   • lisinopril-hydrochlorothiazide (PRINZIDE) 20-12.5 MG per tablet Take 1 Tab by mouth every day. 90 Tab 3   • Multiple Vitamins-Minerals (CENTRUM ADULTS PO) Take  by mouth.       No current facility-administered medications for this visit.      She  has a past medical history of Arthritis, Hyperlipidemia (7/11/2014), and Hypertension. She also has no past medical history of Allergy, unspecified not elsewhere classified, Clotting disorder (HCC), Heart attack (HCC), Heart murmur, Seizure (HCC), Stroke (HCC), Thyroid disease, Type II or unspecified type diabetes mellitus without mention of complication, not stated as uncontrolled, or Unspecified asthma(493.90).    ROS  No chest pain, no shortness of breath, no abdominal pain       Objective:     /86   Pulse 95   Temp 36.8 °C (98.3 °F) (Temporal)   Resp 12   Ht 1.575 m (5' 2\")   Wt 77.6 kg (171 lb)   SpO2 92%  Body mass index is 31.28 kg/m².   Physical Exam:  Constitutional: Alert, no distress.  Skin: Warm, dry, good turgor, no rashes in visible areas.  Eye: Equal, round and reactive, conjunctiva clear, lids normal.  Neck: Trachea midline  Respiratory: Unlabored respiratory effort  Cardiovascular: mild b/l LE edema  Psych: Alert and oriented x3, normal affect and mood.        Assessment and Plan:   The following treatment plan was discussed    1. Colon cancer screening  - COLOGUARD (FIT DNA)    2. Need for vaccination  - INFLUENZA VACCINE, HIGH DOSE (65+ ONLY)    3. Essential hypertension  Uncontrolled.   - " lisinopril-hydrochlorothiazide (PRINZIDE) 20-12.5 MG per tablet; Take 1 Tab by mouth every day.  Dispense: 90 Tab; Refill: 3    4. Encounter for screening mammogram for breast cancer  - MA-SCREENING MAMMO BILAT W/CAD; Future    5. Obesity (BMI 30-39.9)  - Patient identified as having weight management issue.  Appropriate orders and counseling given.    6. Hyperlipidemia, unspecified hyperlipidemia type  - Comp Metabolic Panel; Future  - Lipid Profile; Future      Followup: Return in about 6 months (around 3/22/2021) for recheck BP and HR before pt leaves pls..

## 2020-09-23 NOTE — TELEPHONE ENCOUNTER
BP and HR not rechecked at end of visit,   pls ask pt to return in 1-2 weeks for BP and HR check.  Thank you  Adilia Russell M.D.

## 2020-09-23 NOTE — TELEPHONE ENCOUNTER
BP and MI was rechecked yesterday. It was 142/86 and HR 95. Would you still like patient to come in 1-2 weeks?

## 2020-11-04 DIAGNOSIS — Z12.31 ENCOUNTER FOR SCREENING MAMMOGRAM FOR BREAST CANCER: ICD-10-CM

## 2021-01-05 NOTE — TELEPHONE ENCOUNTER
1. Caller Name: Katrin CALIXTO Manuel                                           Call Back Number: [phone] (home)         Patient approves a detailed voicemail message: N\A    Asking for refill Lisinopril. Informed Katrin approved 4/21 and I would contact [facility] to make sure RX ready today.  Katrin agreed with plan     Wife Radha called looking for update [phone]

## 2021-01-11 DIAGNOSIS — Z23 NEED FOR VACCINATION: ICD-10-CM

## 2021-10-08 DIAGNOSIS — I10 ESSENTIAL HYPERTENSION: ICD-10-CM

## 2021-10-08 RX ORDER — LISINOPRIL AND HYDROCHLOROTHIAZIDE 20; 12.5 MG/1; MG/1
1 TABLET ORAL DAILY
Qty: 90 TABLET | Refills: 3 | Status: SHIPPED | OUTPATIENT
Start: 2021-10-08 | End: 2022-10-04

## 2021-10-08 NOTE — TELEPHONE ENCOUNTER
Received request via: Patient    Was the patient seen in the last year in this department? No   9/22/20  Does the patient have an active prescription (recently filled or refills available) for medication(s) requested? No

## 2022-01-12 ENCOUNTER — OFFICE VISIT (OUTPATIENT)
Dept: MEDICAL GROUP | Facility: PHYSICIAN GROUP | Age: 82
End: 2022-01-12
Payer: MEDICARE

## 2022-01-12 VITALS
SYSTOLIC BLOOD PRESSURE: 118 MMHG | BODY MASS INDEX: 28.71 KG/M2 | HEIGHT: 62 IN | TEMPERATURE: 97 F | DIASTOLIC BLOOD PRESSURE: 72 MMHG | RESPIRATION RATE: 16 BRPM | WEIGHT: 156 LBS | HEART RATE: 86 BPM | OXYGEN SATURATION: 92 %

## 2022-01-12 DIAGNOSIS — R53.82 CHRONIC FATIGUE: ICD-10-CM

## 2022-01-12 DIAGNOSIS — E55.9 VITAMIN D DEFICIENCY: ICD-10-CM

## 2022-01-12 DIAGNOSIS — I10 PRIMARY HYPERTENSION: ICD-10-CM

## 2022-01-12 DIAGNOSIS — E78.5 HYPERLIPIDEMIA, UNSPECIFIED HYPERLIPIDEMIA TYPE: ICD-10-CM

## 2022-01-12 DIAGNOSIS — H02.826 EYELID CYST, LEFT: ICD-10-CM

## 2022-01-12 DIAGNOSIS — Z12.31 ENCOUNTER FOR SCREENING MAMMOGRAM FOR BREAST CANCER: ICD-10-CM

## 2022-01-12 DIAGNOSIS — Z78.0 MENOPAUSE: ICD-10-CM

## 2022-01-12 DIAGNOSIS — M81.0 AGE-RELATED OSTEOPOROSIS WITHOUT CURRENT PATHOLOGICAL FRACTURE: ICD-10-CM

## 2022-01-12 PROBLEM — E66.9 OBESITY (BMI 30-39.9): Status: RESOLVED | Noted: 2018-04-16 | Resolved: 2022-01-12

## 2022-01-12 PROCEDURE — G0438 PPPS, INITIAL VISIT: HCPCS | Performed by: FAMILY MEDICINE

## 2022-01-12 RX ORDER — ZOSTER VACCINE RECOMBINANT, ADJUVANTED 50 MCG/0.5
0.5 KIT INTRAMUSCULAR ONCE
Qty: 0.5 ML | Refills: 1 | Status: SHIPPED
Start: 2022-01-12 | End: 2022-01-12

## 2022-01-12 RX ORDER — ERYTHROMYCIN 5 MG/G
1 OINTMENT OPHTHALMIC
Qty: 3.5 G | Refills: 0 | Status: SHIPPED | OUTPATIENT
Start: 2022-01-12 | End: 2022-08-17

## 2022-01-12 ASSESSMENT — ENCOUNTER SYMPTOMS: GENERAL WELL-BEING: GOOD

## 2022-01-12 ASSESSMENT — PATIENT HEALTH QUESTIONNAIRE - PHQ9: CLINICAL INTERPRETATION OF PHQ2 SCORE: 0

## 2022-01-12 ASSESSMENT — ACTIVITIES OF DAILY LIVING (ADL): BATHING_REQUIRES_ASSISTANCE: 0

## 2022-01-12 NOTE — PROGRESS NOTES
Chief Complaint   Patient presents with   • Annual Exam     medicare wellness       HPI:  Katrin Jackson is a 81 y.o. here for Medicare Annual Wellness Visit     Patient Active Problem List    Diagnosis Date Noted   • Obesity (BMI 30-39.9) 04/16/2018   • Age-related osteoporosis without current pathological fracture 09/14/2017   • Bursitis of left shoulder 10/14/2016   • Ganglion of left wrist 10/14/2016   • Osteoporosis 07/11/2014   • Hyperlipidemia 07/11/2014   • Vitamin D deficiency 07/11/2014   • Hypertension 06/13/2014   • Family history of breast cancer 06/13/2014       Current Outpatient Medications   Medication Sig Dispense Refill   • lisinopril-hydrochlorothiazide (PRINZIDE) 20-12.5 MG per tablet Take 1 Tablet by mouth every day. 90 Tablet 3   • Multiple Vitamins-Minerals (CENTRUM ADULTS PO) Take  by mouth.       No current facility-administered medications for this visit.          Current supplements as per medication list.     Allergies: Percodan [oxycodone-aspirin]    Current social contact/activities: none      She  reports that she has never smoked. She has never used smokeless tobacco. She reports current alcohol use. She reports that she does not use drugs.  Counseling given: Not Answered      DPA/Advanced Directive:  Patient does not have an Advanced Directive.  A packet and workshop information was given on Advanced Directives.    ROS:    Gait: Uses no assistive device  Ostomy: No  Other tubes: No  Amputations: No  Chronic oxygen use: No  Last eye exam: [id number]   Wears hearing aids: No   : Denies any urinary leakage during the last 6 months    Screening:      Depression Screening    Little interest or pleasure in doing things?  0 - not at all  Feeling down, depressed , or hopeless? 0 - not at all  Patient Health Questionnaire Score: 0     If depressive symptoms identified deferred to follow up visit unless specifically addressed in assessment and plan.    Interpretation of PHQ-9 Total Score    Score Severity   1-4 No Depression   5-9 Mild Depression   10-14 Moderate Depression   15-19 Moderately Severe Depression   20-27 Severe Depression    Screening for Cognitive Impairment    Three Minute Recall (captain, garden, picture) 1/3    Harjit clock face with all 12 numbers and set the hands to show 5 past 8.  Yes    Cognitive concerns identified deferred for follow up unless specifically addressed in assessment and plan.    Fall Risk Assessment    Has the patient had two or more falls in the last year or any fall with injury in the last year?  No    Safety Assessment    Throw rugs on floor.  Yes  Handrails on all stairs.  No  Good lighting in all hallways.  Yes  Difficulty hearing.  No  Patient counseled about all safety risks that were identified.    Functional Assessment ADLs    Are there any barriers preventing you from cooking for yourself or meeting nutritional needs?  No.    Are there any barriers preventing you from driving safely or obtaining transportation?  No.    Are there any barriers preventing you from using a telephone or calling for help?  No.    Are there any barriers preventing you from shopping?  No.    Are there any barriers preventing you from taking care of your own finances?  No.    Are there any barriers preventing you from managing your medications?  No.    Are there any barriers preventing you from showering, bathing or dressing yourself?  No.    Are you currently engaging in any exercise or physical activity?  Yes.     What is your perception of your health?  Good.      Health Maintenance Summary          Overdue - Annual Wellness Visit (Once) Overdue - never done    No completion history exists for this topic.          Overdue - MAMMOGRAM (Yearly) Order placed this encounter    10/13/2020  MA-SCREENING MAMMO BILAT W/CAD    01/26/2016  [id number]    06/20/2014  [id number]          Postponed - IMM ZOSTER VACCINES (1 of 2) Postponed until 6/12/2022    No  completion history exists for this topic.          Postponed - IMM DTaP/Tdap/Td Vaccine (1 - Tdap) Postponed until 1/12/2023    No completion history exists for this topic.          BONE DENSITY (Every 5 Years) Tentatively due on 9/12/2022 09/12/2017  DS-BONE DENSITY STUDY (DEXA)          COLORECTAL CANCER SCREENING (COLOGUARD STOOL DNA - Every 3 Years) Next due on 10/5/2023    10/05/2020  COLOGUARD COLON CANCER SCREENING    10/05/2020  COLOGUARD COLON CANCER SCREENING    06/13/2009  COLONOSCOPY (Previously completed)          IMM PNEUMOCOCCAL VACCINE: 65+ Years (Series Information) Completed    10/21/2018  Imm Admin: Pneumococcal polysaccharide vaccine (PPSV-23)    03/31/2017  Imm Admin: Pneumococcal Vaccine (PCV7) - HISTORICAL DATA    03/31/2017  Imm Admin: Pneumococcal Conjugate Vaccine (Prevnar/PCV-13)    10/18/2013  Imm Admin: Pneumococcal polysaccharide vaccine (PPSV-23)          IMM INFLUENZA (Series Information) Completed    09/29/2021  Outside Immunization: Fluzone High-Dose Quad    09/22/2020  Imm Admin: Influenza Vaccine Adult HD    10/16/2019  Imm Admin: Influenza Vaccine Adult HD    10/21/2018  Imm Admin: Influenza, Unspecified - HISTORICAL DATA    10/21/2017  Imm Admin: Influenza Vaccine Adult HD    Only the first 5 history entries have been loaded, but more history exists.          COVID-19 Vaccine (Series Information) Completed    10/28/2021  Imm Admin: Moderna SARS-CoV-2 Vaccine    02/20/2021  Imm Admin: Moderna SARS-CoV-2 Vaccine    01/23/2021  Imm Admin: Moderna SARS-CoV-2 Vaccine          IMM HEP B VACCINE (Series Information) Aged Out    No completion history exists for this topic.          IMM MENINGOCOCCAL VACCINE (MCV4) (Series Information) Aged Out    No completion history exists for this topic.                Patient Care Team:  Adilia Russell M.D. as PCP - General (Family Medicine)        Social History     Tobacco Use   • Smoking status: Never Smoker   • Smokeless tobacco: Never Used  "  Substance Use Topics   • Alcohol use: Yes     Comment: occasional   • Drug use: No     Family History   Problem Relation Age of Onset   • Cancer Mother 53        pancreas   • Cancer Sister         breast   • Diabetes Neg Hx    • Heart Disease Neg Hx    • Stroke Neg Hx      She  has a past medical history of Arthritis, Hyperlipidemia (7/11/2014), and Hypertension. She also has no past medical history of Allergy, unspecified not elsewhere classified, Clotting disorder (HCC), Heart attack (HCC), Heart murmur, Seizure (HCC), Stroke (HCC), Thyroid disease, Type II or unspecified type diabetes mellitus without mention of complication, not stated as uncontrolled, or Unspecified asthma(493.90).   Past Surgical History:   Procedure Laterality Date   • SHOULDER SURGERY Left 2016    adhesive capsulitis   • ABDOMINAL HYSTERECTOMY TOTAL         Exam:   /72   Pulse 86   Temp 36.1 °C (97 °F) (Temporal)   Resp 16   Ht 1.575 m (5' 2\")   Wt 70.8 kg (156 lb)  Body mass index is 28.53 kg/m².    Hearing excellent.    Dentition good  Alert, oriented in no acute distress.  Eye contact is good, speech goal directed, affect calm    Assessment and Plan. The following treatment and monitoring plan is recommended:    1. Encounter for screening mammogram for breast cancer    Hypertension  Well controlled on lisinopril hctz 20-12.5  She has no CP  No LE edema  She has no syncope or near syncope  She has to sit on the edge of her bed for a few seconds before getting up in the mornings to prevent dizziness.   We discussed lower dose lisionpril 10mg hctz 12.5 but pt notes she was on the lower dose in the past and BP was no controlled.   She does not want a lower dose.   Advised to stay well hydrated, continue with not getting up too quickly.     Eyelid cyst, left  Left lower lid small cyst on the inside  Present for 3-4 weeks  Smaller with hot compresses but not resolving  Her ophthalmologist in [place] looked at it but didn't treat it. "   No pain or allergies or discharge.   rx for erythromycin ointment provided.       Osteoporosis  Patient is on calcium and vit d. dexa in 2017 showed osteopenia  She could not tolerate GI side effects of fosamax  Repeat dexa is due. Order provided.     Hyperlipidemia  Chronic condition,   Repeat labs ordered  Encouraged 80% plant based diet.     Vitamin D deficiency  Repeat labs ordered  She has osteopenia  Takes a daily vit d supplement.     Services suggested: No services needed at this time  Health Care Screening: Age-appropriate preventive services recommended by USPTF and ACIP covered by Medicare were discussed today. Services ordered if indicated and agreed upon by the patient.  Referrals offered: Community-based lifestyle interventions to reduce health risks and promote self-management and wellness, fall prevention, nutrition, physical activity, tobacco-use cessation, weight loss, and mental health services as per orders if indicated.    Discussion today about general wellness and lifestyle habits:    · Prevent falls and reduce trip hazards; Cautioned about securing or removing rugs.  · Have a working fire alarm and carbon monoxide detector;   · Engage in regular physical activity and social activities     Follow-up: No follow-ups on file..

## 2022-01-12 NOTE — ASSESSMENT & PLAN NOTE
Left lower lid small cyst on the inside  Present for 3-4 weeks  Smaller with hot compresses but not resolving  Her ophthalmologist in [place] looked at it but didn't treat it.   No pain or allergies or discharge.   rx for erythromycin ointment provided.

## 2022-01-12 NOTE — ASSESSMENT & PLAN NOTE
Patient is on calcium and vit d. dexa in 2017 showed osteopenia  She could not tolerate GI side effects of fosamax  Repeat dexa is due. Order provided.

## 2022-01-12 NOTE — ASSESSMENT & PLAN NOTE
Well controlled on lisinopril hctz 20-12.5  She has no CP  No LE edema  She has no syncope or near syncope  She has to sit on the edge of her bed for a few seconds before getting up in the mornings to prevent dizziness.   We discussed lower dose lisionpril 10mg hctz 12.5 but pt notes she was on the lower dose in the past and BP was no controlled.   She does not want a lower dose.   Advised to stay well hydrated, continue with not getting up too quickly.

## 2022-02-14 ENCOUNTER — HOSPITAL ENCOUNTER (OUTPATIENT)
Dept: LAB | Facility: MEDICAL CENTER | Age: 82
End: 2022-02-14
Attending: FAMILY MEDICINE
Payer: MEDICARE

## 2022-02-14 DIAGNOSIS — E55.9 VITAMIN D DEFICIENCY: ICD-10-CM

## 2022-02-14 DIAGNOSIS — E78.5 HYPERLIPIDEMIA, UNSPECIFIED HYPERLIPIDEMIA TYPE: ICD-10-CM

## 2022-02-14 DIAGNOSIS — R53.82 CHRONIC FATIGUE: ICD-10-CM

## 2022-02-14 LAB
25(OH)D3 SERPL-MCNC: 59 NG/ML (ref 30–100)
ALBUMIN SERPL BCP-MCNC: 4.4 G/DL (ref 3.2–4.9)
ALBUMIN/GLOB SERPL: 1.9 G/DL
ALP SERPL-CCNC: 57 U/L (ref 30–99)
ALT SERPL-CCNC: 11 U/L (ref 2–50)
ANION GAP SERPL CALC-SCNC: 11 MMOL/L (ref 7–16)
AST SERPL-CCNC: 18 U/L (ref 12–45)
BASOPHILS # BLD AUTO: 0.3 % (ref 0–1.8)
BASOPHILS # BLD: 0.03 K/UL (ref 0–0.12)
BILIRUB SERPL-MCNC: 0.3 MG/DL (ref 0.1–1.5)
BUN SERPL-MCNC: 26 MG/DL (ref 8–22)
CALCIUM SERPL-MCNC: 10.6 MG/DL (ref 8.5–10.5)
CHLORIDE SERPL-SCNC: 104 MMOL/L (ref 96–112)
CHOLEST SERPL-MCNC: 227 MG/DL (ref 100–199)
CO2 SERPL-SCNC: 27 MMOL/L (ref 20–33)
CREAT SERPL-MCNC: 1.12 MG/DL (ref 0.5–1.4)
EOSINOPHIL # BLD AUTO: 0.14 K/UL (ref 0–0.51)
EOSINOPHIL NFR BLD: 1.6 % (ref 0–6.9)
ERYTHROCYTE [DISTWIDTH] IN BLOOD BY AUTOMATED COUNT: 43.9 FL (ref 35.9–50)
FASTING STATUS PATIENT QL REPORTED: NORMAL
GLOBULIN SER CALC-MCNC: 2.3 G/DL (ref 1.9–3.5)
GLUCOSE SERPL-MCNC: 108 MG/DL (ref 65–99)
HCT VFR BLD AUTO: 45.9 % (ref 37–47)
HDLC SERPL-MCNC: 43 MG/DL
HGB BLD-MCNC: 15 G/DL (ref 12–16)
IMM GRANULOCYTES # BLD AUTO: 0.04 K/UL (ref 0–0.11)
IMM GRANULOCYTES NFR BLD AUTO: 0.5 % (ref 0–0.9)
LDLC SERPL CALC-MCNC: 133 MG/DL
LYMPHOCYTES # BLD AUTO: 1.79 K/UL (ref 1–4.8)
LYMPHOCYTES NFR BLD: 20.3 % (ref 22–41)
MCH RBC QN AUTO: 30.1 PG (ref 27–33)
MCHC RBC AUTO-ENTMCNC: 32.7 G/DL (ref 33.6–35)
MCV RBC AUTO: 92.2 FL (ref 81.4–97.8)
MONOCYTES # BLD AUTO: 0.73 K/UL (ref 0–0.85)
MONOCYTES NFR BLD AUTO: 8.3 % (ref 0–13.4)
NEUTROPHILS # BLD AUTO: 6.07 K/UL (ref 2–7.15)
NEUTROPHILS NFR BLD: 69 % (ref 44–72)
NRBC # BLD AUTO: 0 K/UL
NRBC BLD-RTO: 0 /100 WBC
PLATELET # BLD AUTO: 192 K/UL (ref 164–446)
PMV BLD AUTO: 11.8 FL (ref 9–12.9)
POTASSIUM SERPL-SCNC: 4.2 MMOL/L (ref 3.6–5.5)
PROT SERPL-MCNC: 6.7 G/DL (ref 6–8.2)
RBC # BLD AUTO: 4.98 M/UL (ref 4.2–5.4)
SODIUM SERPL-SCNC: 142 MMOL/L (ref 135–145)
TRIGL SERPL-MCNC: 255 MG/DL (ref 0–149)
WBC # BLD AUTO: 8.8 K/UL (ref 4.8–10.8)

## 2022-02-14 PROCEDURE — 36415 COLL VENOUS BLD VENIPUNCTURE: CPT

## 2022-02-14 PROCEDURE — 80053 COMPREHEN METABOLIC PANEL: CPT

## 2022-02-14 PROCEDURE — 80061 LIPID PANEL: CPT

## 2022-02-14 PROCEDURE — 82306 VITAMIN D 25 HYDROXY: CPT

## 2022-02-14 PROCEDURE — 85025 COMPLETE CBC W/AUTO DIFF WBC: CPT

## 2022-02-24 DIAGNOSIS — N18.31 STAGE 3A CHRONIC KIDNEY DISEASE: ICD-10-CM

## 2022-02-24 DIAGNOSIS — E55.9 VITAMIN D DEFICIENCY: ICD-10-CM

## 2022-02-24 DIAGNOSIS — E78.5 HYPERLIPIDEMIA, UNSPECIFIED HYPERLIPIDEMIA TYPE: ICD-10-CM

## 2022-02-24 DIAGNOSIS — E83.52 HYPERCALCEMIA: ICD-10-CM

## 2022-02-24 NOTE — RESULT ENCOUNTER NOTE
Katrin,  Your labs show that your cholesterol levels, calcium levels have increased also your kidney function has dropped from 60 to 40s. Please increase your hydration to about 8 glasses of water a day. I have also ordered follow-up labs for you to do before your next visit with me in July. You should fast for about 8 to 10 hours before getting the labs done. You can get the labs done about a week before your next visit with me in July.  Adilia Russell M.D.

## 2022-07-29 ENCOUNTER — HOSPITAL ENCOUNTER (OUTPATIENT)
Dept: LAB | Facility: MEDICAL CENTER | Age: 82
End: 2022-07-29
Attending: FAMILY MEDICINE
Payer: MEDICARE

## 2022-07-29 DIAGNOSIS — E78.5 HYPERLIPIDEMIA, UNSPECIFIED HYPERLIPIDEMIA TYPE: ICD-10-CM

## 2022-07-29 DIAGNOSIS — E83.52 HYPERCALCEMIA: ICD-10-CM

## 2022-07-29 DIAGNOSIS — N18.31 STAGE 3A CHRONIC KIDNEY DISEASE: ICD-10-CM

## 2022-07-29 DIAGNOSIS — E55.9 VITAMIN D DEFICIENCY: ICD-10-CM

## 2022-07-29 LAB
25(OH)D3 SERPL-MCNC: 67 NG/ML (ref 30–100)
ALBUMIN SERPL BCP-MCNC: 4.3 G/DL (ref 3.2–4.9)
ALBUMIN/GLOB SERPL: 1.7 G/DL
ALP SERPL-CCNC: 57 U/L (ref 30–99)
ALT SERPL-CCNC: 12 U/L (ref 2–50)
ANION GAP SERPL CALC-SCNC: 10 MMOL/L (ref 7–16)
AST SERPL-CCNC: 18 U/L (ref 12–45)
BILIRUB SERPL-MCNC: 0.4 MG/DL (ref 0.1–1.5)
BUN SERPL-MCNC: 22 MG/DL (ref 8–22)
CALCIUM SERPL-MCNC: 10.7 MG/DL (ref 8.5–10.5)
CHLORIDE SERPL-SCNC: 103 MMOL/L (ref 96–112)
CHOLEST SERPL-MCNC: 222 MG/DL (ref 100–199)
CO2 SERPL-SCNC: 28 MMOL/L (ref 20–33)
CREAT SERPL-MCNC: 1.07 MG/DL (ref 0.5–1.4)
FASTING STATUS PATIENT QL REPORTED: NORMAL
GFR SERPLBLD CREATININE-BSD FMLA CKD-EPI: 53 ML/MIN/1.73 M 2
GLOBULIN SER CALC-MCNC: 2.6 G/DL (ref 1.9–3.5)
GLUCOSE SERPL-MCNC: 104 MG/DL (ref 65–99)
HDLC SERPL-MCNC: 45 MG/DL
LDLC SERPL CALC-MCNC: 138 MG/DL
POTASSIUM SERPL-SCNC: 3.9 MMOL/L (ref 3.6–5.5)
PROT SERPL-MCNC: 6.9 G/DL (ref 6–8.2)
PTH-INTACT SERPL-MCNC: 62.8 PG/ML (ref 14–72)
SODIUM SERPL-SCNC: 141 MMOL/L (ref 135–145)
TRIGL SERPL-MCNC: 194 MG/DL (ref 0–149)

## 2022-07-29 PROCEDURE — 82306 VITAMIN D 25 HYDROXY: CPT

## 2022-07-29 PROCEDURE — 83970 ASSAY OF PARATHORMONE: CPT

## 2022-07-29 PROCEDURE — 80053 COMPREHEN METABOLIC PANEL: CPT

## 2022-07-29 PROCEDURE — 80061 LIPID PANEL: CPT

## 2022-07-29 PROCEDURE — 36415 COLL VENOUS BLD VENIPUNCTURE: CPT

## 2022-08-04 ENCOUNTER — OFFICE VISIT (OUTPATIENT)
Dept: MEDICAL GROUP | Facility: PHYSICIAN GROUP | Age: 82
End: 2022-08-04
Payer: MEDICARE

## 2022-08-04 VITALS
TEMPERATURE: 97.5 F | OXYGEN SATURATION: 93 % | SYSTOLIC BLOOD PRESSURE: 118 MMHG | WEIGHT: 152 LBS | HEIGHT: 62 IN | BODY MASS INDEX: 27.97 KG/M2 | HEART RATE: 92 BPM | RESPIRATION RATE: 16 BRPM | DIASTOLIC BLOOD PRESSURE: 80 MMHG

## 2022-08-04 DIAGNOSIS — Z23 NEED FOR VACCINATION: ICD-10-CM

## 2022-08-04 DIAGNOSIS — E78.5 HYPERLIPIDEMIA, UNSPECIFIED HYPERLIPIDEMIA TYPE: ICD-10-CM

## 2022-08-04 DIAGNOSIS — M81.0 AGE-RELATED OSTEOPOROSIS WITHOUT CURRENT PATHOLOGICAL FRACTURE: ICD-10-CM

## 2022-08-04 DIAGNOSIS — I10 PRIMARY HYPERTENSION: ICD-10-CM

## 2022-08-04 PROCEDURE — 90677 PCV20 VACCINE IM: CPT | Performed by: FAMILY MEDICINE

## 2022-08-04 PROCEDURE — 99214 OFFICE O/P EST MOD 30 MIN: CPT | Mod: 25 | Performed by: FAMILY MEDICINE

## 2022-08-04 PROCEDURE — G0009 ADMIN PNEUMOCOCCAL VACCINE: HCPCS | Performed by: FAMILY MEDICINE

## 2022-08-04 RX ORDER — SIMVASTATIN 20 MG
20 TABLET ORAL NIGHTLY
Qty: 90 TABLET | Refills: 3 | Status: SHIPPED | OUTPATIENT
Start: 2022-08-04 | End: 2023-07-25

## 2022-08-04 ASSESSMENT — FIBROSIS 4 INDEX: FIB4 SCORE: 2.14

## 2022-08-04 NOTE — PROGRESS NOTES
"Subjective:   Katrin Jackson is a 81 y.o. female here today for evaluation and management of:     Hyperlipidemia  Chronic condition, no history of heart or strokes  Never smoked except in high school  LDL 120s  She enjoys some steak twice a week.   Has a tiny ice cream for dessert daily.   Encouraged increasing vegetables.   Repeat labs in 6-12 months.   We discussed a statin risks and benefits reveiwed. She is not interested in starting a new medication.     Hypertension  Well controlled on lisinopril hctz  Mild swelling around ankles once in a while.       Osteoporosis  Continues on ca, vitd,   Sister who is a few months older than her had a hip fracture.            Current medicines (including changes today)  Current Outpatient Medications   Medication Sig Dispense Refill   • simvastatin (ZOCOR) 20 MG Tab Take 1 Tablet by mouth every evening. 90 Tablet 3   • erythromycin 5 MG/GM Ointment Apply 1 Application to left eye at bedtime. 3.5 g 0   • lisinopril-hydrochlorothiazide (PRINZIDE) 20-12.5 MG per tablet Take 1 Tablet by mouth every day. 90 Tablet 3   • Multiple Vitamins-Minerals (CENTRUM ADULTS PO) Take  by mouth.       No current facility-administered medications for this visit.     She  has a past medical history of Arthritis, Hyperlipidemia (7/11/2014), and Hypertension.    She has no past medical history of Allergy, unspecified not elsewhere classified, Clotting disorder (HCC), Heart attack (HCC), Heart murmur, Seizure (HCC), Stroke (HCC), Thyroid disease, Type II or unspecified type diabetes mellitus without mention of complication, not stated as uncontrolled, or Unspecified asthma(493.90).    ROS  No chest pain, no shortness of breath, no abdominal pain       Objective:     /80   Pulse 92   Temp 36.4 °C (97.5 °F) (Temporal)   Resp 16   Ht 1.575 m (5' 2\")   Wt 68.9 kg (152 lb)   SpO2 93%  Body mass index is 27.8 kg/m².   Physical Exam:  Constitutional: Alert, no distress.  Skin: Warm, dry, " good turgor, no rashes in visible areas.  Eye: Equal, round and reactive, conjunctiva clear, lids normal.  ENMT: Lips without lesions, good dentition, oropharynx clear.  Neck: Trachea midline, no masses, no thyromegaly. No cervical or supraclavicular lymphadenopathy  Respiratory: Unlabored respiratory effort, lungs clear to auscultation, no wheezes, no ronchi.  Cardiovascular: Normal S1, S2, no murmur, no edema.  Abdomen: Soft, non-tender, no masses, no hepatosplenomegaly.  Psych: Alert and oriented x3, normal affect and mood.        Assessment and Plan:   The following treatment plan was discussed    1. Hyperlipidemia, unspecified hyperlipidemia type      2. Primary hypertension      3. Age-related osteoporosis without current pathological fracture      4. Need for vaccination  - Pneumococcal Conjugate Vaccine 20-Valent (19 yrs+)      Followup: Return in about 6 months (around 2/4/2023) for dyslipidemia, review labs. .

## 2022-08-04 NOTE — ASSESSMENT & PLAN NOTE
Chronic condition, no history of heart or strokes  Never smoked except in high school  LDL 120s  She enjoys some steak twice a week.   Has a tiny ice cream for dessert daily.   Encouraged increasing vegetables.   Repeat labs in 6-12 months.   We discussed a statin risks and benefits reveiwed. She is not interested in starting a new medication.

## 2022-08-09 NOTE — RESULT ENCOUNTER NOTE
Josh Wilkes,  Your labs show slightly high calcium, your vitamin D and PTH are normal.   Cholesterol and triglycerides are still high. Please make appt sooner than Feb if you want to discuss results with me in clinic or via virtual visit.   Thank you,  Adilia Russell M.D.

## 2022-08-17 PROBLEM — S82.851A TRIMALLEOLAR FRACTURE OF ANKLE, CLOSED, RIGHT, INITIAL ENCOUNTER: Status: ACTIVE | Noted: 2022-08-17

## 2023-03-26 DIAGNOSIS — I10 ESSENTIAL HYPERTENSION: ICD-10-CM

## 2023-03-27 NOTE — TELEPHONE ENCOUNTER
Received request via: Pharmacy    Was the patient seen in the last year in this department? Yes    Does the patient have an active prescription (recently filled or refills available) for medication(s) requested? No    Does the patient have shelter Plus and need 100 day supply (blood pressure, diabetes and cholesterol meds only)? Patient does not have SCP      Last Office Visit:08/04/2022  Last labs:07/29/2022

## 2023-03-28 RX ORDER — LISINOPRIL AND HYDROCHLOROTHIAZIDE 20; 12.5 MG/1; MG/1
TABLET ORAL
Qty: 90 TABLET | Refills: 3 | Status: SHIPPED | OUTPATIENT
Start: 2023-03-28

## 2023-05-03 ENCOUNTER — TELEPHONE (OUTPATIENT)
Dept: HEALTH INFORMATION MANAGEMENT | Facility: OTHER | Age: 83
End: 2023-05-03

## 2023-07-24 NOTE — TELEPHONE ENCOUNTER
Received request via: Pharmacy    Was the patient seen in the last year in this department? Yes    Does the patient have an active prescription (recently filled or refills available) for medication(s) requested? No    Does the patient have skilled nursing Plus and need 100 day supply (blood pressure, diabetes and cholesterol meds only)? Patient does not have SCP      Last office Visit: 08/04/2022  Last Labs: 07/29/2022

## 2023-07-25 RX ORDER — SIMVASTATIN 20 MG
20 TABLET ORAL EVERY EVENING
Qty: 90 TABLET | Refills: 0 | Status: SHIPPED | OUTPATIENT
Start: 2023-07-25 | End: 2023-10-20

## 2023-10-20 RX ORDER — SIMVASTATIN 20 MG
20 TABLET ORAL EVERY EVENING
Qty: 90 TABLET | Refills: 0 | Status: CANCELLED | OUTPATIENT
Start: 2023-10-20

## 2023-10-25 ENCOUNTER — OFFICE VISIT (OUTPATIENT)
Dept: MEDICAL GROUP | Facility: PHYSICIAN GROUP | Age: 83
End: 2023-10-25
Payer: MEDICARE

## 2023-10-25 VITALS
DIASTOLIC BLOOD PRESSURE: 62 MMHG | RESPIRATION RATE: 16 BRPM | TEMPERATURE: 97.6 F | SYSTOLIC BLOOD PRESSURE: 114 MMHG | WEIGHT: 155 LBS | HEIGHT: 62 IN | OXYGEN SATURATION: 91 % | BODY MASS INDEX: 28.52 KG/M2 | HEART RATE: 100 BPM

## 2023-10-25 DIAGNOSIS — E78.5 HYPERLIPIDEMIA, UNSPECIFIED HYPERLIPIDEMIA TYPE: ICD-10-CM

## 2023-10-25 DIAGNOSIS — Z87.81 HISTORY OF FRACTURE OF RIGHT ANKLE: ICD-10-CM

## 2023-10-25 DIAGNOSIS — E55.9 VITAMIN D DEFICIENCY: ICD-10-CM

## 2023-10-25 DIAGNOSIS — N18.31 CHRONIC KIDNEY DISEASE, STAGE 3A: ICD-10-CM

## 2023-10-25 DIAGNOSIS — Z78.0 MENOPAUSE: ICD-10-CM

## 2023-10-25 DIAGNOSIS — N18.31 STAGE 3A CHRONIC KIDNEY DISEASE: ICD-10-CM

## 2023-10-25 PROBLEM — H02.826 EYELID CYST, LEFT: Status: RESOLVED | Noted: 2022-01-12 | Resolved: 2023-10-25

## 2023-10-25 PROBLEM — S82.851A TRIMALLEOLAR FRACTURE OF ANKLE, CLOSED, RIGHT, INITIAL ENCOUNTER: Status: RESOLVED | Noted: 2022-08-17 | Resolved: 2023-10-25

## 2023-10-25 PROCEDURE — 3074F SYST BP LT 130 MM HG: CPT | Performed by: FAMILY MEDICINE

## 2023-10-25 PROCEDURE — 3078F DIAST BP <80 MM HG: CPT | Performed by: FAMILY MEDICINE

## 2023-10-25 PROCEDURE — 99214 OFFICE O/P EST MOD 30 MIN: CPT | Performed by: FAMILY MEDICINE

## 2023-10-25 SDOH — HEALTH STABILITY: PHYSICAL HEALTH: ON AVERAGE, HOW MANY DAYS PER WEEK DO YOU ENGAGE IN MODERATE TO STRENUOUS EXERCISE (LIKE A BRISK WALK)?: 2 DAYS

## 2023-10-25 SDOH — ECONOMIC STABILITY: HOUSING INSECURITY
IN THE LAST 12 MONTHS, WAS THERE A TIME WHEN YOU DID NOT HAVE A STEADY PLACE TO SLEEP OR SLEPT IN A SHELTER (INCLUDING NOW)?: NO

## 2023-10-25 SDOH — ECONOMIC STABILITY: FOOD INSECURITY: WITHIN THE PAST 12 MONTHS, THE FOOD YOU BOUGHT JUST DIDN'T LAST AND YOU DIDN'T HAVE MONEY TO GET MORE.: NEVER TRUE

## 2023-10-25 SDOH — ECONOMIC STABILITY: FOOD INSECURITY: WITHIN THE PAST 12 MONTHS, YOU WORRIED THAT YOUR FOOD WOULD RUN OUT BEFORE YOU GOT MONEY TO BUY MORE.: NEVER TRUE

## 2023-10-25 SDOH — ECONOMIC STABILITY: TRANSPORTATION INSECURITY
IN THE PAST 12 MONTHS, HAS THE LACK OF TRANSPORTATION KEPT YOU FROM MEDICAL APPOINTMENTS OR FROM GETTING MEDICATIONS?: NO

## 2023-10-25 SDOH — ECONOMIC STABILITY: INCOME INSECURITY: IN THE LAST 12 MONTHS, WAS THERE A TIME WHEN YOU WERE NOT ABLE TO PAY THE MORTGAGE OR RENT ON TIME?: NO

## 2023-10-25 SDOH — ECONOMIC STABILITY: TRANSPORTATION INSECURITY
IN THE PAST 12 MONTHS, HAS LACK OF RELIABLE TRANSPORTATION KEPT YOU FROM MEDICAL APPOINTMENTS, MEETINGS, WORK OR FROM GETTING THINGS NEEDED FOR DAILY LIVING?: NO

## 2023-10-25 SDOH — HEALTH STABILITY: PHYSICAL HEALTH: ON AVERAGE, HOW MANY MINUTES DO YOU ENGAGE IN EXERCISE AT THIS LEVEL?: 10 MIN

## 2023-10-25 SDOH — ECONOMIC STABILITY: TRANSPORTATION INSECURITY
IN THE PAST 12 MONTHS, HAS LACK OF TRANSPORTATION KEPT YOU FROM MEETINGS, WORK, OR FROM GETTING THINGS NEEDED FOR DAILY LIVING?: NO

## 2023-10-25 SDOH — ECONOMIC STABILITY: HOUSING INSECURITY

## 2023-10-25 SDOH — ECONOMIC STABILITY: INCOME INSECURITY: HOW HARD IS IT FOR YOU TO PAY FOR THE VERY BASICS LIKE FOOD, HOUSING, MEDICAL CARE, AND HEATING?: PATIENT DECLINED

## 2023-10-25 SDOH — HEALTH STABILITY: MENTAL HEALTH
STRESS IS WHEN SOMEONE FEELS TENSE, NERVOUS, ANXIOUS, OR CAN'T SLEEP AT NIGHT BECAUSE THEIR MIND IS TROUBLED. HOW STRESSED ARE YOU?: ONLY A LITTLE

## 2023-10-25 ASSESSMENT — SOCIAL DETERMINANTS OF HEALTH (SDOH)
HOW OFTEN DO YOU ATTEND CHURCH OR RELIGIOUS SERVICES?: NEVER
HOW OFTEN DO YOU ATTEND CHURCH OR RELIGIOUS SERVICES?: NEVER
HOW OFTEN DO YOU HAVE A DRINK CONTAINING ALCOHOL: NEVER
IN A TYPICAL WEEK, HOW MANY TIMES DO YOU TALK ON THE PHONE WITH FAMILY, FRIENDS, OR NEIGHBORS?: ONCE A WEEK
HOW HARD IS IT FOR YOU TO PAY FOR THE VERY BASICS LIKE FOOD, HOUSING, MEDICAL CARE, AND HEATING?: PATIENT DECLINED
HOW MANY DRINKS CONTAINING ALCOHOL DO YOU HAVE ON A TYPICAL DAY WHEN YOU ARE DRINKING: PATIENT DOES NOT DRINK
ARE YOU MARRIED, WIDOWED, DIVORCED, SEPARATED, NEVER MARRIED, OR LIVING WITH A PARTNER?: LIVING WITH PARTNER
ARE YOU MARRIED, WIDOWED, DIVORCED, SEPARATED, NEVER MARRIED, OR LIVING WITH A PARTNER?: LIVING WITH PARTNER
HOW OFTEN DO YOU ATTENT MEETINGS OF THE CLUB OR ORGANIZATION YOU BELONG TO?: NEVER
DO YOU BELONG TO ANY CLUBS OR ORGANIZATIONS SUCH AS CHURCH GROUPS UNIONS, FRATERNAL OR ATHLETIC GROUPS, OR SCHOOL GROUPS?: NO
IN A TYPICAL WEEK, HOW MANY TIMES DO YOU TALK ON THE PHONE WITH FAMILY, FRIENDS, OR NEIGHBORS?: ONCE A WEEK
HOW OFTEN DO YOU HAVE SIX OR MORE DRINKS ON ONE OCCASION: NEVER
WITHIN THE PAST 12 MONTHS, YOU WORRIED THAT YOUR FOOD WOULD RUN OUT BEFORE YOU GOT THE MONEY TO BUY MORE: NEVER TRUE
DO YOU BELONG TO ANY CLUBS OR ORGANIZATIONS SUCH AS CHURCH GROUPS UNIONS, FRATERNAL OR ATHLETIC GROUPS, OR SCHOOL GROUPS?: NO
HOW OFTEN DO YOU ATTENT MEETINGS OF THE CLUB OR ORGANIZATION YOU BELONG TO?: NEVER

## 2023-10-25 ASSESSMENT — PATIENT HEALTH QUESTIONNAIRE - PHQ9: CLINICAL INTERPRETATION OF PHQ2 SCORE: 0

## 2023-10-25 ASSESSMENT — LIFESTYLE VARIABLES
SKIP TO QUESTIONS 9-10: 1
HOW MANY STANDARD DRINKS CONTAINING ALCOHOL DO YOU HAVE ON A TYPICAL DAY: PATIENT DOES NOT DRINK
AUDIT-C TOTAL SCORE: 0
HOW OFTEN DO YOU HAVE A DRINK CONTAINING ALCOHOL: NEVER
HOW OFTEN DO YOU HAVE SIX OR MORE DRINKS ON ONE OCCASION: NEVER

## 2023-10-25 ASSESSMENT — FIBROSIS 4 INDEX: FIB4 SCORE: 2.19

## 2023-10-25 NOTE — PATIENT INSTRUCTIONS
Please get fasting labs, fasting for 8-10 hours before next visit. You can make an appointment for the lab or walk in.   Lab hours [facility] location: Monday to Friday 6 am - 4 pm, Saturday 7 am -noon  Even if you lose your lab paperwork, you can still come in to get your lab done.

## 2023-10-25 NOTE — ASSESSMENT & PLAN NOTE
Slipped in her garage Aug 2022, right ankle comminuted fracture s/p orif with plate 7 +1 screws   Hard recovery, was in a recliner many months, had a tough time in WC then in boot for 3 months.   Does not need to have hardware removed.   Pt completed PT  Now doing ok with ankle healed, good ROM, only occasional pain and swelling.     Osteopenia on dexa 2017, repeat dexa ordered.   Continue ca, vit d, regular walking, weight bearing exercises.

## 2023-10-25 NOTE — PROGRESS NOTES
Subjective:   Katrin Jackson is a 83 y.o. female here today for evaluation and management of:     History of fracture of right ankle  Slipped in her garage Aug 2022, right ankle comminuted fracture s/p orif with plate 7 +1 screws   Hard recovery, was in a recliner many months, had a tough time in WC then in boot for 3 months.   Does not need to have hardware removed.   Pt completed PT  Now doing ok with ankle healed, good ROM, only occasional pain and swelling.     Osteopenia on dexa 2017, repeat dexa ordered.   Continue ca, vit d, regular walking, weight bearing exercises.      HCC Gap Form    Diagnosis to address: N18.31 - Chronic kidney disease, stage 3a (Formerly [facility])  Assessment and plan: Chronic, stable. Continue with current defined treatment plan: stable, continue lisinopril, HTN control, drink 64 oz water daily. Follow-up at least annually.  Last edited 10/25/23 13:37 PDT by Adilia Russell M.D.             Current medicines (including changes today)  Current Outpatient Medications   Medication Sig Dispense Refill    simvastatin (ZOCOR) 20 MG Tab TAKE 1 TABLET BY MOUTH ONCE DAILY IN THE EVENING 90 Tablet 3    lisinopril-hydrochlorothiazide (PRINZIDE) 20-12.5 MG per tablet Take 1 tablet by mouth once daily 90 Tablet 3    Multiple Vitamins-Minerals (CENTRUM ADULTS PO) Take  by mouth.       No current facility-administered medications for this visit.     She  has a past medical history of Arthritis, Hyperlipidemia (7/11/2014), and Hypertension.    She has no past medical history of Allergy, unspecified not elsewhere classified, Clotting disorder (HCC), Heart attack (HCC), Heart murmur, Seizure (HCC), Stroke (HCC), Thyroid disease, Type II or unspecified type diabetes mellitus without mention of complication, not stated as uncontrolled, or Unspecified asthma(493.90).    ROS  No chest pain, no shortness of breath, no abdominal pain       Objective:     /62   Pulse 100   Temp 36.4 °C (97.6 °F) (Temporal)    "Resp 16   Ht 1.575 m (5' 2\")   Wt 70.3 kg (155 lb)   SpO2 91%  Body mass index is 28.35 kg/m².   Physical Exam:  Constitutional: Alert, no distress.  Skin: Warm, dry, good turgor, no rashes in visible areas.  Eye: Equal, round and reactive, conjunctiva clear, lids normal.  ENMT: Lips without lesions, good dentition, oropharynx clear.  Neck: Trachea midline, no masses, no thyromegaly. No cervical or supraclavicular lymphadenopathy  Respiratory: Unlabored respiratory effort, lungs clear to auscultation, no wheezes, no ronchi.  Cardiovascular: Normal S1, S2, no murmur, no edema.  Abdomen: Soft, non-tender, no masses, no hepatosplenomegaly.  Psych: Alert and oriented x3, normal affect and mood.        Assessment and Plan:   The following treatment plan was discussed    1. History of fracture of right ankle    2. Menopause  - DS-BONE DENSITY STUDY (DEXA); Future    3. Vitamin D deficiency  - VITAMIN D,25 HYDROXY (DEFICIENCY); Future    4. Hyperlipidemia, unspecified hyperlipidemia type  - Lipid Profile; Future    5. Stage 3a chronic kidney disease (HCC)  - Comp Metabolic Panel; Future    6. Chronic kidney disease, stage 3a (HCC)      Followup: Return in about 1 year (around 10/25/2024) for Lab Review.           "

## 2023-11-03 ENCOUNTER — HOSPITAL ENCOUNTER (OUTPATIENT)
Dept: RADIOLOGY | Facility: MEDICAL CENTER | Age: 83
End: 2023-11-03
Attending: FAMILY MEDICINE
Payer: MEDICARE

## 2023-11-03 DIAGNOSIS — Z78.0 MENOPAUSE: ICD-10-CM

## 2023-11-03 PROCEDURE — 77080 DXA BONE DENSITY AXIAL: CPT

## 2024-04-04 DIAGNOSIS — I10 ESSENTIAL HYPERTENSION: ICD-10-CM

## 2024-04-05 NOTE — TELEPHONE ENCOUNTER
Requested Prescriptions     Pending Prescriptions Disp Refills    lisinopril-hydrochlorothiazide (PRINZIDE) 20-12.5 MG per tablet [Pharmacy Med Name: Lisinopril-hydroCHLOROthiazide 20-12.5 MG Oral Tablet] 90 Tablet 0     Sig: Take 1 tablet by mouth once daily      Last office visit: 10/25/23  Last lab: 7/29/22

## 2024-04-09 RX ORDER — LISINOPRIL AND HYDROCHLOROTHIAZIDE 20; 12.5 MG/1; MG/1
TABLET ORAL
Qty: 90 TABLET | Refills: 3 | Status: SHIPPED | OUTPATIENT
Start: 2024-04-09

## 2024-10-29 ENCOUNTER — OFFICE VISIT (OUTPATIENT)
Dept: MEDICAL GROUP | Facility: PHYSICIAN GROUP | Age: 84
End: 2024-10-29
Payer: MEDICARE

## 2024-10-29 VITALS
RESPIRATION RATE: 16 BRPM | HEART RATE: 93 BPM | SYSTOLIC BLOOD PRESSURE: 128 MMHG | OXYGEN SATURATION: 92 % | DIASTOLIC BLOOD PRESSURE: 80 MMHG | HEIGHT: 62 IN | BODY MASS INDEX: 28.71 KG/M2 | TEMPERATURE: 98.4 F | WEIGHT: 156 LBS

## 2024-10-29 DIAGNOSIS — N18.31 CHRONIC KIDNEY DISEASE, STAGE 3A: ICD-10-CM

## 2024-10-29 DIAGNOSIS — N18.31 STAGE 3A CHRONIC KIDNEY DISEASE: ICD-10-CM

## 2024-10-29 DIAGNOSIS — Z12.31 ENCOUNTER FOR SCREENING MAMMOGRAM FOR MALIGNANT NEOPLASM OF BREAST: ICD-10-CM

## 2024-10-29 DIAGNOSIS — Z87.81 HISTORY OF FRACTURE OF RIGHT ANKLE: ICD-10-CM

## 2024-10-29 DIAGNOSIS — E55.9 VITAMIN D DEFICIENCY: ICD-10-CM

## 2024-10-29 DIAGNOSIS — E78.5 HYPERLIPIDEMIA, UNSPECIFIED HYPERLIPIDEMIA TYPE: ICD-10-CM

## 2024-10-29 DIAGNOSIS — M81.0 AGE-RELATED OSTEOPOROSIS WITHOUT CURRENT PATHOLOGICAL FRACTURE: ICD-10-CM

## 2024-10-29 DIAGNOSIS — I10 PRIMARY HYPERTENSION: ICD-10-CM

## 2024-10-29 DIAGNOSIS — Z80.3 FAMILY HISTORY OF MALIGNANT NEOPLASM OF BREAST: ICD-10-CM

## 2024-10-29 RX ORDER — SIMVASTATIN 20 MG
20 TABLET ORAL EVERY EVENING
Qty: 90 TABLET | Refills: 3 | Status: SHIPPED | OUTPATIENT
Start: 2024-10-29

## 2024-10-29 RX ORDER — SIMVASTATIN 20 MG
20 TABLET ORAL EVERY EVENING
Qty: 90 TABLET | Refills: 0 | OUTPATIENT
Start: 2024-10-29

## 2024-10-29 ASSESSMENT — ACTIVITIES OF DAILY LIVING (ADL): BATHING_REQUIRES_ASSISTANCE: 0

## 2024-10-29 ASSESSMENT — PATIENT HEALTH QUESTIONNAIRE - PHQ9: CLINICAL INTERPRETATION OF PHQ2 SCORE: 0

## 2024-10-29 ASSESSMENT — ENCOUNTER SYMPTOMS: GENERAL WELL-BEING: EXCELLENT

## 2024-10-30 RX ORDER — METHYLPREDNISOLONE SODIUM SUCCINATE 125 MG/2ML
125 INJECTION, POWDER, LYOPHILIZED, FOR SOLUTION INTRAMUSCULAR; INTRAVENOUS PRN
OUTPATIENT
Start: 2024-10-30

## 2024-10-30 RX ORDER — EPINEPHRINE 1 MG/ML(1)
0.5 AMPUL (ML) INJECTION PRN
OUTPATIENT
Start: 2024-10-30

## 2024-10-30 RX ORDER — DIPHENHYDRAMINE HYDROCHLORIDE 50 MG/ML
50 INJECTION INTRAMUSCULAR; INTRAVENOUS PRN
OUTPATIENT
Start: 2024-10-30

## 2024-11-02 ENCOUNTER — OUTPATIENT INFUSION SERVICES (OUTPATIENT)
Dept: ONCOLOGY | Facility: MEDICAL CENTER | Age: 84
End: 2024-11-02
Attending: FAMILY MEDICINE
Payer: MEDICARE

## 2024-11-02 VITALS
DIASTOLIC BLOOD PRESSURE: 85 MMHG | OXYGEN SATURATION: 91 % | WEIGHT: 155.87 LBS | HEART RATE: 92 BPM | RESPIRATION RATE: 18 BRPM | BODY MASS INDEX: 29.43 KG/M2 | SYSTOLIC BLOOD PRESSURE: 163 MMHG | TEMPERATURE: 97.6 F | HEIGHT: 61 IN

## 2024-11-02 DIAGNOSIS — M81.0 AGE-RELATED OSTEOPOROSIS WITHOUT CURRENT PATHOLOGICAL FRACTURE: ICD-10-CM

## 2024-11-02 PROCEDURE — 96372 THER/PROPH/DIAG INJ SC/IM: CPT

## 2024-11-02 PROCEDURE — 82565 ASSAY OF CREATININE: CPT

## 2024-11-02 PROCEDURE — 36415 COLL VENOUS BLD VENIPUNCTURE: CPT

## 2024-11-02 PROCEDURE — 82330 ASSAY OF CALCIUM: CPT

## 2024-11-02 PROCEDURE — 700111 HCHG RX REV CODE 636 W/ 250 OVERRIDE (IP): Mod: JG | Performed by: FAMILY MEDICINE

## 2024-11-02 RX ORDER — EPINEPHRINE 1 MG/ML(1)
0.5 AMPUL (ML) INJECTION PRN
OUTPATIENT
Start: 2025-05-01

## 2024-11-02 RX ORDER — METHYLPREDNISOLONE SODIUM SUCCINATE 125 MG/2ML
125 INJECTION, POWDER, LYOPHILIZED, FOR SOLUTION INTRAMUSCULAR; INTRAVENOUS PRN
OUTPATIENT
Start: 2025-05-01

## 2024-11-02 RX ORDER — DIPHENHYDRAMINE HYDROCHLORIDE 50 MG/ML
50 INJECTION INTRAMUSCULAR; INTRAVENOUS PRN
OUTPATIENT
Start: 2025-05-01

## 2024-11-02 RX ADMIN — DENOSUMAB 60 MG: 60 INJECTION SUBCUTANEOUS at 09:29

## 2024-11-02 NOTE — PROGRESS NOTES
Ms Jackson is here today for prolia injection.     Venipuncture to her right AC. Istat labs within parameters for injection.     Prolia given to the back of her right arm.     Pt education provided.     Pt discharged in stable condition.

## 2024-11-03 LAB
CA-I BLD ISE-SCNC: 1.35 MMOL/L (ref 1.1–1.3)
CREAT BLD-MCNC: 1.1 MG/DL (ref 0.5–1.4)

## 2024-11-04 ENCOUNTER — HOSPITAL ENCOUNTER (OUTPATIENT)
Dept: RADIOLOGY | Facility: MEDICAL CENTER | Age: 84
End: 2024-11-04
Payer: MEDICARE

## 2024-12-11 ENCOUNTER — HOSPITAL ENCOUNTER (OUTPATIENT)
Dept: RADIOLOGY | Facility: MEDICAL CENTER | Age: 84
End: 2024-12-11
Attending: FAMILY MEDICINE
Payer: MEDICARE

## 2024-12-11 DIAGNOSIS — Z12.31 ENCOUNTER FOR SCREENING MAMMOGRAM FOR MALIGNANT NEOPLASM OF BREAST: ICD-10-CM

## 2024-12-11 PROCEDURE — 77067 SCR MAMMO BI INCL CAD: CPT

## 2025-03-27 ENCOUNTER — HOSPITAL ENCOUNTER (OUTPATIENT)
Dept: LAB | Facility: MEDICAL CENTER | Age: 85
End: 2025-03-27
Attending: FAMILY MEDICINE
Payer: MEDICARE

## 2025-03-27 DIAGNOSIS — N18.31 STAGE 3A CHRONIC KIDNEY DISEASE: ICD-10-CM

## 2025-03-27 DIAGNOSIS — E55.9 VITAMIN D DEFICIENCY: ICD-10-CM

## 2025-03-27 DIAGNOSIS — E78.5 HYPERLIPIDEMIA, UNSPECIFIED HYPERLIPIDEMIA TYPE: ICD-10-CM

## 2025-03-27 LAB
25(OH)D3 SERPL-MCNC: 56 NG/ML (ref 30–100)
ALBUMIN SERPL BCP-MCNC: 4.4 G/DL (ref 3.2–4.9)
ALBUMIN/GLOB SERPL: 1.6 G/DL
ALP SERPL-CCNC: 48 U/L (ref 30–99)
ALT SERPL-CCNC: 15 U/L (ref 2–50)
ANION GAP SERPL CALC-SCNC: 12 MMOL/L (ref 7–16)
AST SERPL-CCNC: 20 U/L (ref 12–45)
BILIRUB SERPL-MCNC: 0.4 MG/DL (ref 0.1–1.5)
BUN SERPL-MCNC: 24 MG/DL (ref 8–22)
CALCIUM ALBUM COR SERPL-MCNC: 10.4 MG/DL (ref 8.5–10.5)
CALCIUM SERPL-MCNC: 10.7 MG/DL (ref 8.5–10.5)
CHLORIDE SERPL-SCNC: 111 MMOL/L (ref 96–112)
CHOLEST SERPL-MCNC: 173 MG/DL (ref 100–199)
CO2 SERPL-SCNC: 26 MMOL/L (ref 20–33)
CREAT SERPL-MCNC: 1.08 MG/DL (ref 0.5–1.4)
FASTING STATUS PATIENT QL REPORTED: NORMAL
GFR SERPLBLD CREATININE-BSD FMLA CKD-EPI: 51 ML/MIN/1.73 M 2
GLOBULIN SER CALC-MCNC: 2.8 G/DL (ref 1.9–3.5)
GLUCOSE SERPL-MCNC: 106 MG/DL (ref 65–99)
HDLC SERPL-MCNC: 51 MG/DL
LDLC SERPL CALC-MCNC: 90 MG/DL
POTASSIUM SERPL-SCNC: 4 MMOL/L (ref 3.6–5.5)
PROT SERPL-MCNC: 7.2 G/DL (ref 6–8.2)
SODIUM SERPL-SCNC: 149 MMOL/L (ref 135–145)
TRIGL SERPL-MCNC: 160 MG/DL (ref 0–149)

## 2025-03-27 PROCEDURE — 80053 COMPREHEN METABOLIC PANEL: CPT

## 2025-03-27 PROCEDURE — 80061 LIPID PANEL: CPT

## 2025-03-27 PROCEDURE — 82306 VITAMIN D 25 HYDROXY: CPT

## 2025-03-27 PROCEDURE — 36415 COLL VENOUS BLD VENIPUNCTURE: CPT

## 2025-03-28 ENCOUNTER — OFFICE VISIT (OUTPATIENT)
Dept: MEDICAL GROUP | Facility: PHYSICIAN GROUP | Age: 85
End: 2025-03-28
Payer: MEDICARE

## 2025-03-28 ENCOUNTER — RESULTS FOLLOW-UP (OUTPATIENT)
Dept: MEDICAL GROUP | Facility: PHYSICIAN GROUP | Age: 85
End: 2025-03-28

## 2025-03-28 VITALS
WEIGHT: 155 LBS | HEART RATE: 83 BPM | HEIGHT: 62 IN | OXYGEN SATURATION: 93 % | TEMPERATURE: 97.1 F | RESPIRATION RATE: 14 BRPM | BODY MASS INDEX: 28.52 KG/M2 | DIASTOLIC BLOOD PRESSURE: 72 MMHG | SYSTOLIC BLOOD PRESSURE: 118 MMHG

## 2025-03-28 DIAGNOSIS — M25.512 CHRONIC LEFT SHOULDER PAIN: ICD-10-CM

## 2025-03-28 DIAGNOSIS — G89.29 CHRONIC LEFT SHOULDER PAIN: ICD-10-CM

## 2025-03-28 DIAGNOSIS — N18.31 STAGE 3A CHRONIC KIDNEY DISEASE: ICD-10-CM

## 2025-03-28 PROCEDURE — 3078F DIAST BP <80 MM HG: CPT | Performed by: FAMILY MEDICINE

## 2025-03-28 PROCEDURE — 3074F SYST BP LT 130 MM HG: CPT | Performed by: FAMILY MEDICINE

## 2025-03-28 PROCEDURE — 99214 OFFICE O/P EST MOD 30 MIN: CPT | Performed by: FAMILY MEDICINE

## 2025-03-28 ASSESSMENT — PATIENT HEALTH QUESTIONNAIRE - PHQ9: CLINICAL INTERPRETATION OF PHQ2 SCORE: 0

## 2025-03-29 NOTE — PROGRESS NOTES
Subjective:   Katrin Jackson is a 84 y.o. female here today for evaluation and management of:     History of Present Illness  The patient is an 84-year-old female who presents for a follow-up visit.    Her blood pressure, previously elevated to 160 systolic, is now within the normal range on lisinopril-hydrochlorothiazide.    She has chronic kidney disease (CKD) with a GFR in the 50s, last recorded at 51. She does not have diabetes. She does have difficulty drinking water and does not drink sodas. She was encouraged to stay well hydrated with water, consuming 64 ounces daily, to prevent kidney stones. To improve her hydration, it was suggested that she add a squeeze of lemon or adjust the water temperature by adding ice.    She has a history of a right ankle fracture, which was treated with pins. She reports no chronic pain in the area. She utilizes lightweight compression socks on both legs for support.    She also reports a decreased range of motion in her left shoulder due to pain, accompanied by increasingly loud popping noises. Various alternatives were discussed, including starting with some exercises, possibly getting a shoulder injection in the future if severe pain occurs, and getting an x-ray. Unfortunately, the x-ray machine is currently down, so it can be done in 3 months or at one of the other x-ray locations.    She is on Reclast and Prolia treatment every 6 months for osteoporosis and takes calcium and vitamin D supplements.    SOCIAL HISTORY  She does not smoke. 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    MEDICATIONS  Lisinopril hydrochlorothiazide, simvastatin, Reclast, Prolia, calcium and vitamin D supplements.          Current medicines (including changes today)  Current Outpatient Medications   Medication Sig Dispense Refill    simvastatin (ZOCOR) 20 MG Tab Take 1 Tablet by mouth every evening. 90 Tablet 3    lisinopril-hydrochlorothiazide (PRINZIDE) 20-12.5 MG per tablet Take 1  "tablet by mouth once daily 90 Tablet 3    Multiple Vitamins-Minerals (CENTRUM ADULTS PO) Take  by mouth.       No current facility-administered medications for this visit.     She  has a past medical history of Arthritis, Hyperlipidemia (7/11/2014), and Hypertension.    She has no past medical history of Allergy, unspecified not elsewhere classified, Clotting disorder (HCC), Heart attack (HCC), Heart murmur, Seizure (HCC), Stroke (HCC), Thyroid disease, Type II or unspecified type diabetes mellitus without mention of complication, not stated as uncontrolled, or Unspecified asthma(493.90).    ROS  No chest pain, no shortness of breath, no abdominal pain       Objective:     /72 (BP Location: Left arm, Patient Position: Sitting, BP Cuff Size: Adult)   Pulse 83   Temp 36.2 °C (97.1 °F) (Temporal)   Resp 14   Ht 1.575 m (5' 2\")   Wt 70.3 kg (155 lb)   SpO2 93%  Body mass index is 28.35 kg/m².   Physical Exam:  Constitutional: Alert, no distress.  Skin: Warm, dry, good turgor, no rashes in visible areas.  Eye: Equal, round and reactive, conjunctiva clear, lids normal.  ENMT: Lips without lesions, good dentition, oropharynx clear.  Neck: Trachea midline, no masses, no thyromegaly. No cervical or supraclavicular lymphadenopathy  Respiratory: Unlabored respiratory effort, lungs clear to auscultation, no wheezes, no ronchi.  Cardiovascular: Normal S1, S2, no murmur, no edema.  Abdomen: Soft, non-tender, no masses, no hepatosplenomegaly.  Psych: Alert and oriented x3, normal affect and mood.       The following treatment plan was discussed  Assessment & Plan  1. Hypertension.  Her blood pressure has normalized on lisinopril-hydrochlorothiazide, previously elevated to 160 systolic.    2. Hyperlipidemia.  Her LDL cholesterol has significantly improved from the 130s to 90 on simvastatin.    3. Chronic kidney disease (CKD).  Her GFR is in the 50s, with the last lab showing a GFR of 51. She was encouraged to stay " well-hydrated, consuming 64 ounces of water daily, to prevent kidney stones. To improve her hydration, it was suggested that she add a squeeze of lemon or adjust the water temperature by adding ice.    4. Osteoporosis.  She is on Reclast and Prolia treatment every 6 months and takes calcium and vitamin D supplements.    5. Right ankle fracture.  She has a history of right ankle fracture treated with pins, resulting in chronic pain. She wears lightweight compression socks on both legs for support.    6. Left shoulder pain.  She has decreased range of motion in the left shoulder due to pain and popping noises. Various alternatives were discussed, including starting with some exercises, possibly getting a shoulder injection in the future if severe pain occurs, and getting an x-ray. Unfortunately, the x-ray machine is currently down, so it can be done in 3 months or at one of the other x-ray locations.    PROCEDURE  The patient had right ankle fracture treated with pins.    1. Stage 3a chronic kidney disease  - Comp Metabolic Panel; Future    2. Chronic left shoulder pain  - DX-SHOULDER 2+ LEFT; Future      Followup: Return in about 6 months (around 9/28/2025) for Lab Review, print avs with shoulder exercises please.  Verbal consent was acquired by the patient to use [employer] ambient listening note generation during this visit Yes

## 2025-03-31 DIAGNOSIS — I10 ESSENTIAL HYPERTENSION: ICD-10-CM

## 2025-04-03 RX ORDER — LISINOPRIL AND HYDROCHLOROTHIAZIDE 12.5; 2 MG/1; MG/1
1 TABLET ORAL DAILY
Qty: 90 TABLET | Refills: 3 | Status: SHIPPED | OUTPATIENT
Start: 2025-04-03

## 2025-04-03 NOTE — TELEPHONE ENCOUNTER
Received request via: Patient    Was the patient seen in the last year in this department? Yes    Does the patient have an active prescription (recently filled or refills available) for medication(s) requested? No    Pharmacy Name: [facility]     Does the patient have [facility] and need 100-day supply? (This applies to ALL medications) Patient does not have SCP